# Patient Record
Sex: FEMALE | Race: OTHER | HISPANIC OR LATINO | URBAN - METROPOLITAN AREA
[De-identification: names, ages, dates, MRNs, and addresses within clinical notes are randomized per-mention and may not be internally consistent; named-entity substitution may affect disease eponyms.]

---

## 2018-04-19 ENCOUNTER — EMERGENCY (EMERGENCY)
Facility: HOSPITAL | Age: 33
LOS: 1 days | Discharge: HOME | End: 2018-04-19
Attending: EMERGENCY MEDICINE
Payer: MEDICAID

## 2018-04-19 VITALS — SYSTOLIC BLOOD PRESSURE: 125 MMHG | HEART RATE: 85 BPM | DIASTOLIC BLOOD PRESSURE: 79 MMHG

## 2018-04-19 VITALS
HEIGHT: 62 IN | DIASTOLIC BLOOD PRESSURE: 88 MMHG | OXYGEN SATURATION: 100 % | SYSTOLIC BLOOD PRESSURE: 128 MMHG | WEIGHT: 141.1 LBS | HEART RATE: 107 BPM | RESPIRATION RATE: 20 BRPM | TEMPERATURE: 99 F

## 2018-04-19 DIAGNOSIS — Z98.82 BREAST IMPLANT STATUS: Chronic | ICD-10-CM

## 2018-04-19 LAB
ALBUMIN SERPL ELPH-MCNC: 3.8 G/DL — SIGNIFICANT CHANGE UP (ref 3.5–5)
ALP SERPL-CCNC: 94 U/L — SIGNIFICANT CHANGE UP (ref 40–120)
ALT FLD-CCNC: 22 U/L DA — SIGNIFICANT CHANGE UP (ref 10–60)
ANION GAP SERPL CALC-SCNC: 8 MMOL/L — SIGNIFICANT CHANGE UP (ref 5–17)
APPEARANCE UR: CLEAR — SIGNIFICANT CHANGE UP
APTT BLD: 30.1 SEC — SIGNIFICANT CHANGE UP (ref 27.5–37.4)
AST SERPL-CCNC: 12 U/L — SIGNIFICANT CHANGE UP (ref 10–40)
BASOPHILS # BLD AUTO: 0 K/UL — SIGNIFICANT CHANGE UP (ref 0–0.2)
BASOPHILS NFR BLD AUTO: 0.5 % — SIGNIFICANT CHANGE UP (ref 0–2)
BILIRUB SERPL-MCNC: 0.4 MG/DL — SIGNIFICANT CHANGE UP (ref 0.2–1.2)
BILIRUB UR-MCNC: NEGATIVE — SIGNIFICANT CHANGE UP
BUN SERPL-MCNC: 5 MG/DL — LOW (ref 7–18)
CALCIUM SERPL-MCNC: 8.7 MG/DL — SIGNIFICANT CHANGE UP (ref 8.4–10.5)
CHLORIDE SERPL-SCNC: 106 MMOL/L — SIGNIFICANT CHANGE UP (ref 96–108)
CO2 SERPL-SCNC: 27 MMOL/L — SIGNIFICANT CHANGE UP (ref 22–31)
COLOR SPEC: YELLOW — SIGNIFICANT CHANGE UP
CREAT SERPL-MCNC: 0.62 MG/DL — SIGNIFICANT CHANGE UP (ref 0.5–1.3)
DIFF PNL FLD: ABNORMAL
EOSINOPHIL # BLD AUTO: 0.1 K/UL — SIGNIFICANT CHANGE UP (ref 0–0.5)
EOSINOPHIL NFR BLD AUTO: 1.6 % — SIGNIFICANT CHANGE UP (ref 0–6)
GLUCOSE SERPL-MCNC: 75 MG/DL — SIGNIFICANT CHANGE UP (ref 70–99)
GLUCOSE UR QL: NEGATIVE — SIGNIFICANT CHANGE UP
HCG SERPL-ACNC: <1 MIU/ML — SIGNIFICANT CHANGE UP
HCT VFR BLD CALC: 33.2 % — LOW (ref 34.5–45)
HGB BLD-MCNC: 11.2 G/DL — LOW (ref 11.5–15.5)
INR BLD: 1.13 RATIO — SIGNIFICANT CHANGE UP (ref 0.88–1.16)
KETONES UR-MCNC: NEGATIVE — SIGNIFICANT CHANGE UP
LEUKOCYTE ESTERASE UR-ACNC: ABNORMAL
LYMPHOCYTES # BLD AUTO: 1.3 K/UL — SIGNIFICANT CHANGE UP (ref 1–3.3)
LYMPHOCYTES # BLD AUTO: 24.7 % — SIGNIFICANT CHANGE UP (ref 13–44)
MCHC RBC-ENTMCNC: 27.4 PG — SIGNIFICANT CHANGE UP (ref 27–34)
MCHC RBC-ENTMCNC: 33.9 GM/DL — SIGNIFICANT CHANGE UP (ref 32–36)
MCV RBC AUTO: 80.9 FL — SIGNIFICANT CHANGE UP (ref 80–100)
MONOCYTES # BLD AUTO: 0.2 K/UL — SIGNIFICANT CHANGE UP (ref 0–0.9)
MONOCYTES NFR BLD AUTO: 4.5 % — SIGNIFICANT CHANGE UP (ref 2–14)
NEUTROPHILS # BLD AUTO: 3.7 K/UL — SIGNIFICANT CHANGE UP (ref 1.8–7.4)
NEUTROPHILS NFR BLD AUTO: 68.7 % — SIGNIFICANT CHANGE UP (ref 43–77)
NITRITE UR-MCNC: NEGATIVE — SIGNIFICANT CHANGE UP
PH UR: 6.5 — SIGNIFICANT CHANGE UP (ref 5–8)
PLATELET # BLD AUTO: 270 K/UL — SIGNIFICANT CHANGE UP (ref 150–400)
POTASSIUM SERPL-MCNC: 3.4 MMOL/L — LOW (ref 3.5–5.3)
POTASSIUM SERPL-SCNC: 3.4 MMOL/L — LOW (ref 3.5–5.3)
PROT SERPL-MCNC: 8.3 G/DL — SIGNIFICANT CHANGE UP (ref 6–8.3)
PROT UR-MCNC: NEGATIVE — SIGNIFICANT CHANGE UP
PROTHROM AB SERPL-ACNC: 12.3 SEC — SIGNIFICANT CHANGE UP (ref 9.8–12.7)
RBC # BLD: 4.1 M/UL — SIGNIFICANT CHANGE UP (ref 3.8–5.2)
RBC # FLD: 14.4 % — SIGNIFICANT CHANGE UP (ref 10.3–14.5)
SODIUM SERPL-SCNC: 141 MMOL/L — SIGNIFICANT CHANGE UP (ref 135–145)
SP GR SPEC: 1.01 — SIGNIFICANT CHANGE UP (ref 1.01–1.02)
UROBILINOGEN FLD QL: NEGATIVE — SIGNIFICANT CHANGE UP
WBC # BLD: 5.4 K/UL — SIGNIFICANT CHANGE UP (ref 3.8–10.5)
WBC # FLD AUTO: 5.4 K/UL — SIGNIFICANT CHANGE UP (ref 3.8–10.5)

## 2018-04-19 PROCEDURE — 99284 EMERGENCY DEPT VISIT MOD MDM: CPT | Mod: 25

## 2018-04-19 PROCEDURE — 96374 THER/PROPH/DIAG INJ IV PUSH: CPT

## 2018-04-19 PROCEDURE — 99285 EMERGENCY DEPT VISIT HI MDM: CPT

## 2018-04-19 PROCEDURE — 80053 COMPREHEN METABOLIC PANEL: CPT

## 2018-04-19 PROCEDURE — 84702 CHORIONIC GONADOTROPIN TEST: CPT

## 2018-04-19 PROCEDURE — 81001 URINALYSIS AUTO W/SCOPE: CPT

## 2018-04-19 PROCEDURE — 36415 COLL VENOUS BLD VENIPUNCTURE: CPT

## 2018-04-19 PROCEDURE — 87086 URINE CULTURE/COLONY COUNT: CPT

## 2018-04-19 PROCEDURE — 85730 THROMBOPLASTIN TIME PARTIAL: CPT

## 2018-04-19 PROCEDURE — 85027 COMPLETE CBC AUTOMATED: CPT

## 2018-04-19 PROCEDURE — 86901 BLOOD TYPING SEROLOGIC RH(D): CPT

## 2018-04-19 PROCEDURE — 86900 BLOOD TYPING SEROLOGIC ABO: CPT

## 2018-04-19 PROCEDURE — 86850 RBC ANTIBODY SCREEN: CPT

## 2018-04-19 PROCEDURE — 85610 PROTHROMBIN TIME: CPT

## 2018-04-19 PROCEDURE — 87186 SC STD MICRODIL/AGAR DIL: CPT

## 2018-04-19 PROCEDURE — 87040 BLOOD CULTURE FOR BACTERIA: CPT

## 2018-04-19 RX ORDER — PIPERACILLIN AND TAZOBACTAM 4; .5 G/20ML; G/20ML
3.38 INJECTION, POWDER, LYOPHILIZED, FOR SOLUTION INTRAVENOUS ONCE
Qty: 0 | Refills: 0 | Status: COMPLETED | OUTPATIENT
Start: 2018-04-19 | End: 2018-04-19

## 2018-04-19 RX ORDER — CEPHALEXIN 500 MG
1 CAPSULE ORAL
Qty: 40 | Refills: 0 | OUTPATIENT
Start: 2018-04-19 | End: 2018-04-28

## 2018-04-19 RX ORDER — SODIUM CHLORIDE 9 MG/ML
1000 INJECTION INTRAMUSCULAR; INTRAVENOUS; SUBCUTANEOUS ONCE
Qty: 0 | Refills: 0 | Status: DISCONTINUED | OUTPATIENT
Start: 2018-04-19 | End: 2018-04-19

## 2018-04-19 RX ADMIN — PIPERACILLIN AND TAZOBACTAM 200 GRAM(S): 4; .5 INJECTION, POWDER, LYOPHILIZED, FOR SOLUTION INTRAVENOUS at 10:54

## 2018-04-19 NOTE — ED PROVIDER NOTE - SKIN WOUND TYPE
left breast abscess, + fluctuance, no discharge, open wound 5x5 dehisence without bleeding./abscess(s)

## 2018-04-19 NOTE — ED ADULT NURSE NOTE - OBJECTIVE STATEMENT
pt came to ed stating she had breast augmentation in [place] a month ago. she states the left breast has had an abscess for the past week

## 2018-04-19 NOTE — ED PROVIDER NOTE - OBJECTIVE STATEMENT
33 yo F with no Past Medical History that presents with left breast wound s/p breast augmentation and mastopexy March 9, 2018 in [facility]. Was told by surgeon that everything was okay. no fevers, chills, N/V/D, chest pain, SOB, abd pain. No trauma. She noticed in last 2 wks that left breast under nipple that there was an open wound and minimal discharge and bleeding. Now worsening so came to ED for evaluation. No other complaints. eating and drinking well. no smoking, drinking, drugs.  LMP ended yday. No urinary symptoms.

## 2018-04-19 NOTE — ED PROVIDER NOTE - MEDICAL DECISION MAKING DETAILS
31 yo F with no Past Medical History that had breast augmentation 1 month ago in [facility] that presents with left breat pain and discharge with open wound found to have abscess on exam. No systemic symptoms. Plastic Dr Callaway consulted and will see pt in his office tomorrow. Phone and address for office provided to pt and one dose IVAbx given to pt in ED, and Rx for keflex sent to pharmacy. Labs unremarkable. No WBC elevation. no fevers per pt. Cultures sent. Will send home, f/u with Dr Callaway tomorrow. Appt made by me for pt for Dr Callaway tomorrow at 10 am. Return precautions explained to pt. She understands and has no further questions.

## 2018-04-24 LAB
CULTURE RESULTS: SIGNIFICANT CHANGE UP
CULTURE RESULTS: SIGNIFICANT CHANGE UP
SPECIMEN SOURCE: SIGNIFICANT CHANGE UP
SPECIMEN SOURCE: SIGNIFICANT CHANGE UP

## 2019-12-28 ENCOUNTER — EMERGENCY (EMERGENCY)
Facility: HOSPITAL | Age: 34
LOS: 1 days | Discharge: HOME | End: 2019-12-28
Attending: EMERGENCY MEDICINE
Payer: COMMERCIAL

## 2019-12-28 VITALS
SYSTOLIC BLOOD PRESSURE: 112 MMHG | HEIGHT: 63 IN | DIASTOLIC BLOOD PRESSURE: 67 MMHG | OXYGEN SATURATION: 99 % | WEIGHT: 147.05 LBS | RESPIRATION RATE: 18 BRPM | TEMPERATURE: 98 F | HEART RATE: 83 BPM

## 2019-12-28 DIAGNOSIS — Z98.82 BREAST IMPLANT STATUS: Chronic | ICD-10-CM

## 2019-12-28 PROCEDURE — 99282 EMERGENCY DEPT VISIT SF MDM: CPT

## 2019-12-28 NOTE — ED PROVIDER NOTE - PATIENT PORTAL LINK FT
You can access the FollowMyHealth Patient Portal offered by [facility] by registering at the following website: [email or web address]. By joining [employer]’s FollowMyHealth portal, you will also be able to view your health information using other applications (apps) compatible with our system.

## 2019-12-28 NOTE — ED PROVIDER NOTE - OBJECTIVE STATEMENT
33 y/o F with no significant PMHx/PSHx presents to ED c/o intermittent, lateral posterior thigh pain x 1 month. Pt reports spending a lot of time on her feet at work, she also reports a 2012 plastic surgery that involved lipo-polymer injections into her buttocks. Denies any complications after procedure. She is concerned if those injections might be the cause of her pain. Denies back pain. Denies BC use and or recent travel. NKDA.

## 2019-12-28 NOTE — ED ADULT TRIAGE NOTE - HEIGHT IN CM
Patient had cortisone injection Monday, having high blood sugars since in the 200s and pounding headache. Patient states took 5 Metformin yesterday to bring sugars down. Patient reports had blood sugar 146 this morning, currently 210, ate breakfast 0810am. Headache had resolved this morning. Patient asking if should take some extra Metformin for a few days based on Blood sugar readings?  Provider please review and advise. Thank you.     160.02

## 2020-08-30 ENCOUNTER — TRANSCRIPTION ENCOUNTER (OUTPATIENT)
Age: 35
End: 2020-08-30

## 2021-04-12 ENCOUNTER — TRANSCRIPTION ENCOUNTER (OUTPATIENT)
Age: 36
End: 2021-04-12

## 2021-05-26 ENCOUNTER — TRANSCRIPTION ENCOUNTER (OUTPATIENT)
Age: 36
End: 2021-05-26

## 2021-06-01 ENCOUNTER — TRANSCRIPTION ENCOUNTER (OUTPATIENT)
Age: 36
End: 2021-06-01

## 2021-07-07 NOTE — ED ADULT NURSE NOTE - CAS DISCH ACCOMP BY
Family Detail Level: Zone Render In Strict Bullet Format?: No Discontinue Regimen: Betamethasone 0.05% cream

## 2021-09-04 ENCOUNTER — TRANSCRIPTION ENCOUNTER (OUTPATIENT)
Age: 36
End: 2021-09-04

## 2021-09-23 ENCOUNTER — TRANSCRIPTION ENCOUNTER (OUTPATIENT)
Age: 36
End: 2021-09-23

## 2021-09-30 ENCOUNTER — TRANSCRIPTION ENCOUNTER (OUTPATIENT)
Age: 36
End: 2021-09-30

## 2021-10-07 ENCOUNTER — TRANSCRIPTION ENCOUNTER (OUTPATIENT)
Age: 36
End: 2021-10-07

## 2021-10-14 ENCOUNTER — TRANSCRIPTION ENCOUNTER (OUTPATIENT)
Age: 36
End: 2021-10-14

## 2021-10-21 ENCOUNTER — TRANSCRIPTION ENCOUNTER (OUTPATIENT)
Age: 36
End: 2021-10-21

## 2021-10-28 ENCOUNTER — TRANSCRIPTION ENCOUNTER (OUTPATIENT)
Age: 36
End: 2021-10-28

## 2021-11-03 ENCOUNTER — TRANSCRIPTION ENCOUNTER (OUTPATIENT)
Age: 36
End: 2021-11-03

## 2021-11-11 ENCOUNTER — TRANSCRIPTION ENCOUNTER (OUTPATIENT)
Age: 36
End: 2021-11-11

## 2021-11-18 ENCOUNTER — TRANSCRIPTION ENCOUNTER (OUTPATIENT)
Age: 36
End: 2021-11-18

## 2021-11-28 ENCOUNTER — TRANSCRIPTION ENCOUNTER (OUTPATIENT)
Age: 36
End: 2021-11-28

## 2021-12-10 ENCOUNTER — TRANSCRIPTION ENCOUNTER (OUTPATIENT)
Age: 36
End: 2021-12-10

## 2021-12-28 ENCOUNTER — TRANSCRIPTION ENCOUNTER (OUTPATIENT)
Age: 36
End: 2021-12-28

## 2022-01-12 ENCOUNTER — TRANSCRIPTION ENCOUNTER (OUTPATIENT)
Age: 37
End: 2022-01-12

## 2022-06-28 PROBLEM — Z00.00 ENCOUNTER FOR PREVENTIVE HEALTH EXAMINATION: Status: ACTIVE | Noted: 2022-06-28

## 2022-07-11 ENCOUNTER — APPOINTMENT (OUTPATIENT)
Dept: OBGYN | Facility: CLINIC | Age: 37
End: 2022-07-11

## 2022-07-11 ENCOUNTER — LABORATORY RESULT (OUTPATIENT)
Age: 37
End: 2022-07-11

## 2022-07-11 ENCOUNTER — TRANSCRIPTION ENCOUNTER (OUTPATIENT)
Age: 37
End: 2022-07-11

## 2022-07-11 VITALS — WEIGHT: 123 LBS | SYSTOLIC BLOOD PRESSURE: 123 MMHG | DIASTOLIC BLOOD PRESSURE: 79 MMHG

## 2022-07-11 DIAGNOSIS — N76.0 ACUTE VAGINITIS: ICD-10-CM

## 2022-07-11 DIAGNOSIS — Z01.419 ENCOUNTER FOR GYNECOLOGICAL EXAMINATION (GENERAL) (ROUTINE) W/OUT ABNORMAL FINDINGS: ICD-10-CM

## 2022-07-11 DIAGNOSIS — B96.89 ACUTE VAGINITIS: ICD-10-CM

## 2022-07-11 DIAGNOSIS — Z98.890 OTHER SPECIFIED POSTPROCEDURAL STATES: ICD-10-CM

## 2022-07-11 PROCEDURE — 99385 PREV VISIT NEW AGE 18-39: CPT

## 2022-07-11 NOTE — PHYSICAL EXAM
[Examination Of The Breasts] : a normal appearance [Breast Reconstruction Right] : breast reconstruction [Breast Reconstruction Left] : breast reconstruction [No Masses] : no breast masses were palpable [Labia Majora] : normal [Labia Minora] : normal [Discharge] : a  ~M vaginal discharge was present [Normal] : normal [Uterine Adnexae] : normal [FreeTextEntry4] : bv

## 2022-07-11 NOTE — HISTORY OF PRESENT ILLNESS
[FreeTextEntry1] : new patient \par here for annual\par last GYN visit 1 year ago \par has regular menses, heavy \par hx 2 Cd and BTL

## 2022-08-23 ENCOUNTER — APPOINTMENT (OUTPATIENT)
Dept: OBGYN | Facility: CLINIC | Age: 37
End: 2022-08-23

## 2022-08-23 VITALS
SYSTOLIC BLOOD PRESSURE: 134 MMHG | WEIGHT: 128 LBS | DIASTOLIC BLOOD PRESSURE: 87 MMHG | BODY MASS INDEX: 22.68 KG/M2 | HEIGHT: 63 IN

## 2022-09-16 ENCOUNTER — APPOINTMENT (OUTPATIENT)
Dept: OBGYN | Facility: CLINIC | Age: 37
End: 2022-09-16

## 2022-09-16 ENCOUNTER — LABORATORY RESULT (OUTPATIENT)
Age: 37
End: 2022-09-16

## 2022-09-16 VITALS — SYSTOLIC BLOOD PRESSURE: 124 MMHG | WEIGHT: 126 LBS | DIASTOLIC BLOOD PRESSURE: 83 MMHG | BODY MASS INDEX: 22.32 KG/M2

## 2022-09-16 DIAGNOSIS — R87.612 LOW GRADE SQUAMOUS INTRAEPITHELIAL LESION ON CYTOLOGIC SMEAR OF CERVIX (LGSIL): ICD-10-CM

## 2022-09-16 PROCEDURE — 57454 BX/CURETT OF CERVIX W/SCOPE: CPT

## 2022-09-17 PROBLEM — R87.612 LGSIL ON PAP SMEAR OF CERVIX: Status: ACTIVE | Noted: 2022-09-17

## 2022-09-17 NOTE — PROCEDURE
[Colposcopy] : Colposcopy  [Time out performed] : Pre-procedure time out performed.  Patient's name, date of birth and procedure confirmed. [Consent Obtained] : Consent obtained [Risks] : risks [Benefits] : benefits [Alternatives] : alternatives [Patient] : patient [Infection] : infection [Bleeding] : bleeding [Allergic Reaction] : allergic reaction [LGSIL] : LGSIL [HPV High Risk] : HPV high risk [No Premedication] : no premedication [Colposcopy Adequate] : colposcopy adequate [No Abnormalities] : no abnormalities [Biopsy] : biopsy taken [Hemostasis Obtained] : Hemostasis obtained [Tolerated Well] : the patient tolerated the procedure well [de-identified] : 1 [de-identified] : 7 o'clock

## 2023-06-14 ENCOUNTER — EMERGENCY (EMERGENCY)
Facility: HOSPITAL | Age: 38
LOS: 1 days | Discharge: HOME | End: 2023-06-14
Attending: EMERGENCY MEDICINE
Payer: MEDICAID

## 2023-06-14 VITALS
SYSTOLIC BLOOD PRESSURE: 118 MMHG | TEMPERATURE: 99 F | OXYGEN SATURATION: 98 % | HEIGHT: 62 IN | RESPIRATION RATE: 17 BRPM | HEART RATE: 88 BPM | WEIGHT: 121.92 LBS | DIASTOLIC BLOOD PRESSURE: 77 MMHG

## 2023-06-14 DIAGNOSIS — Z98.82 BREAST IMPLANT STATUS: Chronic | ICD-10-CM

## 2023-06-14 LAB
ALBUMIN SERPL ELPH-MCNC: 3.4 G/DL — LOW (ref 3.5–5)
ALP SERPL-CCNC: 73 U/L — SIGNIFICANT CHANGE UP (ref 40–120)
ALT FLD-CCNC: 16 U/L DA — SIGNIFICANT CHANGE UP (ref 10–60)
ANION GAP SERPL CALC-SCNC: 3 MMOL/L — LOW (ref 5–17)
ANISOCYTOSIS BLD QL: SLIGHT — SIGNIFICANT CHANGE UP
APPEARANCE UR: CLEAR — SIGNIFICANT CHANGE UP
AST SERPL-CCNC: 15 U/L — SIGNIFICANT CHANGE UP (ref 10–40)
BACTERIA # UR AUTO: ABNORMAL /HPF
BASOPHILS # BLD AUTO: 0.02 K/UL — SIGNIFICANT CHANGE UP (ref 0–0.2)
BASOPHILS NFR BLD AUTO: 0.1 % — SIGNIFICANT CHANGE UP (ref 0–2)
BILIRUB SERPL-MCNC: 0.2 MG/DL — SIGNIFICANT CHANGE UP (ref 0.2–1.2)
BILIRUB UR-MCNC: NEGATIVE — SIGNIFICANT CHANGE UP
BUN SERPL-MCNC: 8 MG/DL — SIGNIFICANT CHANGE UP (ref 7–18)
CALCIUM SERPL-MCNC: 9.2 MG/DL — SIGNIFICANT CHANGE UP (ref 8.4–10.5)
CHLORIDE SERPL-SCNC: 108 MMOL/L — SIGNIFICANT CHANGE UP (ref 96–108)
CO2 SERPL-SCNC: 25 MMOL/L — SIGNIFICANT CHANGE UP (ref 22–31)
COLOR SPEC: YELLOW — SIGNIFICANT CHANGE UP
CREAT SERPL-MCNC: 0.52 MG/DL — SIGNIFICANT CHANGE UP (ref 0.5–1.3)
DACRYOCYTES BLD QL SMEAR: SLIGHT — SIGNIFICANT CHANGE UP
DIFF PNL FLD: ABNORMAL
EGFR: 123 ML/MIN/1.73M2 — SIGNIFICANT CHANGE UP
ELLIPTOCYTES BLD QL SMEAR: SLIGHT — SIGNIFICANT CHANGE UP
EOSINOPHIL # BLD AUTO: 0.02 K/UL — SIGNIFICANT CHANGE UP (ref 0–0.5)
EOSINOPHIL NFR BLD AUTO: 0.1 % — SIGNIFICANT CHANGE UP (ref 0–6)
EPI CELLS # UR: ABNORMAL /HPF
GLUCOSE SERPL-MCNC: 98 MG/DL — SIGNIFICANT CHANGE UP (ref 70–99)
GLUCOSE UR QL: NEGATIVE — SIGNIFICANT CHANGE UP
HCG SERPL-ACNC: <1 MIU/ML — SIGNIFICANT CHANGE UP
HCT VFR BLD CALC: 29.3 % — LOW (ref 34.5–45)
HGB BLD-MCNC: 8.6 G/DL — LOW (ref 11.5–15.5)
HYPOCHROMIA BLD QL: SIGNIFICANT CHANGE UP
IMM GRANULOCYTES NFR BLD AUTO: 0.4 % — SIGNIFICANT CHANGE UP (ref 0–0.9)
KETONES UR-MCNC: NEGATIVE — SIGNIFICANT CHANGE UP
LACTATE SERPL-SCNC: 1.1 MMOL/L — SIGNIFICANT CHANGE UP (ref 0.7–2)
LEUKOCYTE ESTERASE UR-ACNC: ABNORMAL
LIDOCAIN IGE QN: 135 U/L — SIGNIFICANT CHANGE UP (ref 73–393)
LYMPHOCYTES # BLD AUTO: 1.8 K/UL — SIGNIFICANT CHANGE UP (ref 1–3.3)
LYMPHOCYTES # BLD AUTO: 11.3 % — LOW (ref 13–44)
MANUAL SMEAR VERIFICATION: SIGNIFICANT CHANGE UP
MCHC RBC-ENTMCNC: 19.8 PG — LOW (ref 27–34)
MCHC RBC-ENTMCNC: 29.4 GM/DL — LOW (ref 32–36)
MCV RBC AUTO: 67.5 FL — LOW (ref 80–100)
MICROCYTES BLD QL: SIGNIFICANT CHANGE UP
MONOCYTES # BLD AUTO: 0.74 K/UL — SIGNIFICANT CHANGE UP (ref 0–0.9)
MONOCYTES NFR BLD AUTO: 4.6 % — SIGNIFICANT CHANGE UP (ref 2–14)
NEUTROPHILS # BLD AUTO: 13.28 K/UL — HIGH (ref 1.8–7.4)
NEUTROPHILS NFR BLD AUTO: 83.5 % — HIGH (ref 43–77)
NITRITE UR-MCNC: NEGATIVE — SIGNIFICANT CHANGE UP
NRBC # BLD: 0 /100 WBCS — SIGNIFICANT CHANGE UP (ref 0–0)
PH UR: 6.5 — SIGNIFICANT CHANGE UP (ref 5–8)
PLAT MORPH BLD: NORMAL — SIGNIFICANT CHANGE UP
PLATELET # BLD AUTO: 408 K/UL — HIGH (ref 150–400)
POIKILOCYTOSIS BLD QL AUTO: SLIGHT — SIGNIFICANT CHANGE UP
POTASSIUM SERPL-MCNC: 4.3 MMOL/L — SIGNIFICANT CHANGE UP (ref 3.5–5.3)
POTASSIUM SERPL-SCNC: 4.3 MMOL/L — SIGNIFICANT CHANGE UP (ref 3.5–5.3)
PROT SERPL-MCNC: 8.4 G/DL — HIGH (ref 6–8.3)
PROT UR-MCNC: 30 MG/DL
RBC # BLD: 4.34 M/UL — SIGNIFICANT CHANGE UP (ref 3.8–5.2)
RBC # FLD: 17.8 % — HIGH (ref 10.3–14.5)
RBC BLD AUTO: ABNORMAL
RBC CASTS # UR COMP ASSIST: ABNORMAL /HPF (ref 0–2)
SODIUM SERPL-SCNC: 136 MMOL/L — SIGNIFICANT CHANGE UP (ref 135–145)
SP GR SPEC: 1.01 — SIGNIFICANT CHANGE UP (ref 1.01–1.02)
UROBILINOGEN FLD QL: 4 MG/DL
WBC # BLD: 15.93 K/UL — HIGH (ref 3.8–10.5)
WBC # FLD AUTO: 15.93 K/UL — HIGH (ref 3.8–10.5)
WBC UR QL: ABNORMAL /HPF (ref 0–5)

## 2023-06-14 PROCEDURE — 76830 TRANSVAGINAL US NON-OB: CPT | Mod: 26

## 2023-06-14 PROCEDURE — 74177 CT ABD & PELVIS W/CONTRAST: CPT | Mod: 26,MG

## 2023-06-14 PROCEDURE — 76856 US EXAM PELVIC COMPLETE: CPT | Mod: 26

## 2023-06-14 PROCEDURE — 99285 EMERGENCY DEPT VISIT HI MDM: CPT

## 2023-06-14 PROCEDURE — G1004: CPT

## 2023-06-14 RX ORDER — CEFOTETAN DISODIUM 1 G
2 VIAL (EA) INJECTION ONCE
Refills: 0 | Status: COMPLETED | OUTPATIENT
Start: 2023-06-14 | End: 2023-06-14

## 2023-06-14 RX ORDER — CEFOTETAN DISODIUM 1 G
2000 VIAL (EA) INJECTION ONCE
Refills: 0 | Status: DISCONTINUED | OUTPATIENT
Start: 2023-06-14 | End: 2023-06-14

## 2023-06-14 RX ORDER — ACETAMINOPHEN 500 MG
1000 TABLET ORAL ONCE
Refills: 0 | Status: COMPLETED | OUTPATIENT
Start: 2023-06-14 | End: 2023-06-14

## 2023-06-14 RX ORDER — SODIUM CHLORIDE 9 MG/ML
1000 INJECTION INTRAMUSCULAR; INTRAVENOUS; SUBCUTANEOUS ONCE
Refills: 0 | Status: COMPLETED | OUTPATIENT
Start: 2023-06-14 | End: 2023-06-14

## 2023-06-14 RX ADMIN — Medication 400 MILLIGRAM(S): at 18:36

## 2023-06-14 RX ADMIN — Medication 100 GRAM(S): at 23:55

## 2023-06-14 RX ADMIN — Medication 1000 MILLIGRAM(S): at 19:06

## 2023-06-14 RX ADMIN — SODIUM CHLORIDE 1000 MILLILITER(S): 9 INJECTION INTRAMUSCULAR; INTRAVENOUS; SUBCUTANEOUS at 18:31

## 2023-06-14 RX ADMIN — SODIUM CHLORIDE 1000 MILLILITER(S): 9 INJECTION INTRAMUSCULAR; INTRAVENOUS; SUBCUTANEOUS at 19:31

## 2023-06-14 NOTE — ED PROVIDER NOTE - OBJECTIVE STATEMENT
39 y/o f with pmhx denies presents for abd pain on right side that began on Sunday. Pain was mild at first but worsening over the last few days. no fever no chills. no vomiting. no diarrhea. no back pain. no vaginal bleeding or discharge. no urinary symptoms. no hematuria. no medications taken. Pain worse today. LMP 5/28/23

## 2023-06-14 NOTE — ED PROVIDER NOTE - PHYSICAL EXAMINATION
Gen.  no acute resp distress  HEENT:  perrl eomi pharynx fabio  Lungs:  b/l bs  CVS: S1S2   Abd;  + right side abd tend to palp. no guarding no distention. no llq tend.   Ext: no edema no erythema  Neuro: aaox3   MSK: strength 5/5 b/l upper and lower ext.

## 2023-06-14 NOTE — ED PROVIDER NOTE - PROGRESS NOTE DETAILS
Karyn: as per obgyn pt can be dc home. rx for doxy for 10 days. f/u with OBGYN. return precautions discussed.

## 2023-06-14 NOTE — ED PROVIDER NOTE - NSFOLLOWUPINSTRUCTIONS_ED_ALL_ED_FT
Your diagnosis this visit was: Tubal ovarian abscess     From this ED visit you were prescribed: Doxycycline as prescribed     You may be contacted by our Emergency Department Referrals Coordinator to set up your follow-up appointment within 24-48 hours of your discharge, Monday through Friday.   We recommend you follow up with: Your obstetrician    alternatively our Gyn clinic at:  OBGYN at [facility]  [address] [facility] [address] · [phone]     Please return to the Emergency Department if you experience any of the following symptoms:  - Shortness of breath or trouble breathing  - Pressure, pain, or tightness in the chest  - Face drooping, arm weakness or speech difficulty,  - Persistent or severe vomiting  - Head injury or loss of consciousness  - Nonstop bleeding or an open wound    Ovarian Abscess    WHAT YOU NEED TO KNOW:    What is an ovarian abscess? An ovarian abscess is a pus-filled pocket in an ovary. An ovarian abscess is usually caused by bacteria that travel from another part of your body. The bacteria can also travel up your vagina and move into your uterus through your cervix. Bacteria infect the ovary or part of the fallopian tube next to the ovary. An abscess that starts in a fallopian tube and spreads to the ovary is called a tuboovarian abscess (TOA). Less commonly, the abscess can start in the ovary and not involve the fallopian tube.  Female Reproductive System    What increases my risk for an ovarian abscess?    Pelvic inflammatory disease (PID)    Unprotected sex, sex with more than one partner, or sex during adolescence    Anything that weakens your immune system, such as diabetes, HIV/AIDS, or chemotherapy    Infertility treatment that involved stimulating your ovaries    Diverticulitis, appendicitis, or inflammatory bowel disease    An infection in your pelvis after surgery, or an infection that travels through your bloodstream    Use of an intrauterine device (IUD) to prevent pregnancy  What are the signs and symptoms of an ovarian abscess?    Pain or ache in your abdomen or pelvis, or pain that worsens with activity or during sex    Tender area in your lower abdomen    Heavy monthly periods, spotting, or vaginal bleeding between periods    Vaginal discharge    Nausea or vomiting    Lower back pain    Fatigue or fever  How is an ovarian abscess diagnosed? Your healthcare provider will ask about your symptoms and when they started. Tell your healthcare provider about any medical conditions (such as PID) or surgeries you have had. Tell your healthcare provider about any sexually transmitted infections (STIs) that you or your partner may have. You may be given a pregnancy test as well as any of the following:    A bimanual vaginal exam is used to examine your cervix and ovaries. Your healthcare provider will insert 2 gloved fingers into your vagina and place the other hand on your abdomen. Your healthcare provider will feel for your cervix and ovaries while pressing down lightly on your abdomen. If you feel pain, you may have PID.    Blood and urine tests may be used to check for infection. The tests may also show if another condition is causing your symptoms.    A culture or smear test is used to take a sample of discharge from your vagina or cervix to be tested.    An ultrasound or CT may be used to show pictures of your ovary and abscess. The pictures may help confirm that you have an abscess and how large it is. You may be given contrast liquid before a CT scan to help the abscess show up better. Tell the healthcare provider if you have ever had an allergic reaction to contrast liquid.    Laparoscopy is surgery to look directly at your uterus, ovaries, and fallopian tubes. A scope is put into your abdomen through a small incision. You may have one or more incisions in or below your bellybutton. Ask your healthcare provider for more information about this surgery.  How is an ovarian abscess treated? An ovarian abscess may need to be treated in the hospital. You may need any of the following:    Antibiotics are given to fight a bacterial infection. You may get antibiotics through an IV for several days.    Drainage is a procedure used to drain the bacteria from your ovary. Drainage may be done through a needle or during surgery. The area that had the abscess will then be cleaned out.    A hysterectomy may be needed if the infection spreads from the ovary. You may need to have one or both ovaries removed. Your fallopian tubes and uterus may also need to be removed. A hysterectomy will prevent you from being able to become pregnant. Talk to your healthcare provider about other treatment options if you want to have children.    Surgery may be used to remove the abscess. Surgery will be necessary if the abscess ruptures. A ruptured ovarian abscess is a life-threatening emergency that needs immediate treatment.  What can I do to manage an ovarian abscess?    Do not have sex until your healthcare provider says it is okay. You will need to finish treatment before it is safe to have sex.    Do not have unprotected sex. Always use a latex condom. Do not have sex while you or your partners are being treated for an STI.    Talk to your sex partners. If you have an STI, tell your recent partners. Tell them to see a healthcare provider for testing and treatment. This will help stop the spread of infection to others or back to you.  When should I seek immediate care?    You have sudden, severe abdominal or pelvic pain.    Your heartbeat or breathing is faster than normal for you.    You have heavy vaginal bleeding and feel lightheaded.    You have new or worsening pain.  When should I contact my healthcare provider?    You have nausea or are vomiting.    Your pain does not get better even after you take pain medicine.    You have questions or concerns about your condition or care.  CARE AGREEMENT:    You have the right to help plan your care. Learn about your health condition and how it may be treated. Discuss treatment options with your healthcare providers to decide what care you want to receive. You always have the right to refuse treatment.

## 2023-06-14 NOTE — ED PROVIDER NOTE - PATIENT PORTAL LINK FT
You can access the FollowMyHealth Patient Portal offered by [facility] by registering at the following website: [email or web address]. By joining [employer]’s FollowMyHealth portal, you will also be able to view your health information using other applications (apps) compatible with our system.

## 2023-06-14 NOTE — ED ADULT NURSE NOTE - OBJECTIVE STATEMENT
39 yo female sitting on a chair c/o right-sided abdominal pain that started 3 days ago. Patient denies nausea, vomiting, or diarrhea at this time.

## 2023-06-14 NOTE — ED PROVIDER NOTE - CLINICAL SUMMARY MEDICAL DECISION MAKING FREE TEXT BOX
ATTG: : abd pain on right lower quad pain, concerns include but not limited to appy / uti / gyn causeas (ectopic preg) will check hcg, check labs, check ct a/p (Pending hcg), pain medication and re eval for dispo.

## 2023-06-14 NOTE — CONSULT NOTE ADULT - PROBLEM SELECTOR RECOMMENDATION 9
A/p: 37 y/o female with hydrosalpinx, likely 2/2 tubo-ovarian abscess. pt stable  - pt stable for discharge  - s/p cefotetan in ED  - patient to be discharged with doxycycline 100 BID x10 days  - strict follow up instructions with patient's outpatient PCP who provides Gyn care, but [facility] clinic information given as well for patient to establish ob/gyn care  - return precautions discussed A/p: 37 y/o female with hydrosalpinx, likely 2/2 tubo-ovarian abscess. pt stable  - pt stable for discharge  - cervical culture taken  - s/p cefotetan in ED  - patient to be discharged with doxycycline 100 BID x10 days  - strict follow up instructions with patient's outpatient PCP who provides Gyn care, but [facility] clinic information given as well for patient to establish ob/gyn care  - return precautions discussed

## 2023-06-14 NOTE — ED PROVIDER NOTE - NSFOLLOWUPCLINICS_GEN_ALL_ED_FT
Michael BROWN  [facility]  [address] [place] [address]  Phone: [phone]  Fax: [phone]

## 2023-06-14 NOTE — CONSULT NOTE ADULT - SUBJECTIVE AND OBJECTIVE BOX
39 y/o female presents to the ED with progressively worsening lower abdominal pain x 4 days.   Denies vaginal bleeding, vaginal discharge, chest pain, shortness of breath, dizziness, palpitations, n/v/d/c, fever, chills or any other complaints     LMP 2023, pt last sexually active approx 1 month ago  Ob:   1)  prim c/s  2)  rpt c/s  Gyn: pt admits to menstrual periods q28 days with bleeding for 5-6 days. Patient's PCP provides her primary Ob/Gyn care, but she does not specifically see an Ob/Gyn  Pt denies std's, abnormal pap smear, fibroids or ovarian cysts  pmhx: denies  pshx: c/s x2, approx  abdominoplasty  allergies: nka  meds: nka  sochx: Pt denies etoh/drug/tobacco use  Psych denies h/o anxiety or depression    REVIEW OF SYSTEMS: see HPI	    PE:  Vital Signs Last 24 Hrs  T(C): 36.7 (15 Glenn 2023 00:30), Max: 37.1 (2023 16:08)  T(F): 98.1 (15 Glenn 2023 00:30), Max: 98.7 (2023 16:08)  HR: 81 (15 Glenn 2023 00:30) (81 - 88)  BP: 106/71 (15 Glenn 2023 00:30) (106/71 - 118/77)  BP(mean): --  RR: 16 (15 Glenn 2023 00:30) (16 - 17)  SpO2: 98% (15 Glenn 2023 00:30) (98% - 98%)    Parameters below as of 15 Glenn 2023 00:30  Patient On (Oxygen Delivery Method): room air      Gen: A&Ox3, NAD, patient sitting patiently and preoccupied with phone and other belongings, appears comfortable, in no acute distress  abd: soft, nt, nd, no rebound or guarding; no cvat b/l  pelvis: no cervical motion tenderness; moderately thick white/beige non-odorous discharge observed in the cervical os, no vaginal bleeding, cervix closed/long, no uterine tenderness; uterus approx 8wks size regular in contour, mobile. mild right adnexal tenderness with deep palpation, no left sided adnexal tenderness, no masses appreciated b/l     LABS:                        8.6    15.93 )-----------( 408      ( 2023 18:30 )             29.3     06-14    136  |  108  |  8   ----------------------------<  98  4.3   |  25  |  0.52    Ca    9.2      2023 18:30    TPro  8.4<H>  /  Alb  3.4<L>  /  TBili  0.2  /  DBili  x   /  AST  15  /  ALT  16  /  AlkPhos  73        Urinalysis Basic - ( 2023 18:30 )    Color: Yellow / Appearance: Clear / S.010 / pH: x  Gluc: x / Ketone: Negative  / Bili: Negative / Urobili: 4 mg/dL   Blood: x / Protein: 30 mg/dL / Nitrite: Negative   Leuk Esterase: Moderate / RBC: 2-5 /HPF / WBC 11-25 /HPF   Sq Epi: x / Non Sq Epi: x / Bacteria: Moderate /HPF    HCG Quantitative, Serum: <1      RADIOLOGY & ADDITIONAL STUDIES:  < from: US Pelvis Complete (US Pelvis Complete .) (23 @ 22:34) >  ACC: [id number] EXAM:  US TRANSVAGINAL   ORDERED BY: SAMARA VANN     ACC: [id number] EXAM:  US PELVIC COMPLETE   ORDERED BY: SAMARA VANN     PROCEDURE DATE:  2023          INTERPRETATION:  CLINICAL INFORMATION: Pelvic pain with right adnexal   collection on CT    LMP: 2023    COMPARISON: Same day CT    TECHNIQUE:  Endovaginal and transabdominal pelvic sonogram. Color and Spectral   Doppler was performed.    FINDINGS:  Uterus: Normal size. Within normal limits.  Endometrium: . Within normal limits.    Right ovary: Normal size. Within normal limits. Flow is documented.  Left ovary: Normal size. Within normal limits. Flow is documented.    5.3 cm right adnexal tubular structure.    Fluid: None.    IMPRESSION:  5.3 cm right adnexal dilated tubular structure corresponding to   abnormality on CT. In conjunction with findings of CT, this likely   represents hydrosalpinx. Correlate for pyosalpinx/salpingitis.      --- End of Report ---            RENEE CAMEJO MD; Attending Radiologist  This document has been electronically signed. 2023 10:38PM    < end of copied text >  < from: US Transvaginal (23 @ 22:33) >  ACC: [id number] EXAM:  US TRANSVAGINAL   ORDERED BY: SAMARA VANN     ACC: [id number] EXAM:  US PELVIC COMPLETE   ORDERED BY: SAMARA VANN     PROCEDURE DATE:  2023          INTERPRETATION:  CLINICAL INFORMATION: Pelvic pain with right adnexal   collection on CT    LMP: 2023    COMPARISON: Same day CT    TECHNIQUE:  Endovaginal and transabdominal pelvic sonogram. Color and Spectral   Doppler was performed.    FINDINGS:  Uterus: Normal size. Within normal limits.  Endometrium: . Within normal limits.    Right ovary: Normal size. Within normal limits. Flow is documented.  Left ovary: Normal size. Within normal limits. Flow is documented.    5.3 cm right adnexal tubular structure.    Fluid: None.    IMPRESSION:  5.3 cm right adnexal dilated tubular structure corresponding to   abnormality on CT. In conjunction with findings of CT, this likely   represents hydrosalpinx. Correlate for pyosalpinx/salpingitis.      --- End of Report ---            RENEE CAMEJO MD; Attending Radiologist  This document has been electronically signed. 2023 10:38PM    < end of copied text >      A/p: 39 y/o female with hydrosalpinx, likely 2/2 tubo-ovarian abscess. pt stable  - pt stable for discharge  - s/p cefotetan in ED  - patient to be discharged with doxycycline 100 BID x10 days  - strict follow up instructions with patient's outpatient PCP who provides Gyn care, but [facility] clinic information given as well for patient to establish ob/gyn care  - return precautions discussed    -d/w Dr. Kalpesh gzt attending 39 y/o female presents to the ED with progressively worsening lower abdominal pain x 4 days.   Denies vaginal bleeding, vaginal discharge, chest pain, shortness of breath, dizziness, palpitations, n/v/d/c, fever, chills or any other complaints     LMP 2023, pt last sexually active approx 1 month ago  Ob:   1)  prim c/s  2)  rpt c/s  Gyn: pt admits to menstrual periods q28 days with bleeding for 5-6 days. Patient's PCP provides her primary Ob/Gyn care, but she does not specifically see an Ob/Gyn  Pt denies std's, abnormal pap smear, fibroids or ovarian cysts  pmhx: denies  pshx: c/s x2, approx  abdominoplasty  allergies: nka  meds: nka  sochx: Pt denies etoh/drug/tobacco use  Psych denies h/o anxiety or depression    REVIEW OF SYSTEMS: see HPI	    PE:  Vital Signs Last 24 Hrs  T(C): 36.7 (15 Glenn 2023 00:30), Max: 37.1 (2023 16:08)  T(F): 98.1 (15 Glenn 2023 00:30), Max: 98.7 (2023 16:08)  HR: 81 (15 Glenn 2023 00:30) (81 - 88)  BP: 106/71 (15 Glenn 2023 00:30) (106/71 - 118/77)  BP(mean): --  RR: 16 (15 Glenn 2023 00:30) (16 - 17)  SpO2: 98% (15 Glenn 2023 00:30) (98% - 98%)    Parameters below as of 15 Glenn 2023 00:30  Patient On (Oxygen Delivery Method): room air      Gen: A&Ox3, NAD, patient sitting patiently and preoccupied with phone and other belongings, appears comfortable, in no acute distress  abd: soft, nt, nd, no rebound or guarding; no cvat b/l  pelvis: no cervical motion tenderness; moderately thick white/beige non-odorous discharge observed in the cervical os, no vaginal bleeding, cervix closed/long, no uterine tenderness; uterus approx 8wks size regular in contour, mobile. mild right adnexal tenderness with deep palpation, no left sided adnexal tenderness, no masses appreciated b/l     LABS:                        8.6    15.93 )-----------( 408      ( 2023 18:30 )             29.3     06-14    136  |  108  |  8   ----------------------------<  98  4.3   |  25  |  0.52    Ca    9.2      2023 18:30    TPro  8.4<H>  /  Alb  3.4<L>  /  TBili  0.2  /  DBili  x   /  AST  15  /  ALT  16  /  AlkPhos  73        Urinalysis Basic - ( 2023 18:30 )    Color: Yellow / Appearance: Clear / S.010 / pH: x  Gluc: x / Ketone: Negative  / Bili: Negative / Urobili: 4 mg/dL   Blood: x / Protein: 30 mg/dL / Nitrite: Negative   Leuk Esterase: Moderate / RBC: 2-5 /HPF / WBC 11-25 /HPF   Sq Epi: x / Non Sq Epi: x / Bacteria: Moderate /HPF    HCG Quantitative, Serum: <1      RADIOLOGY & ADDITIONAL STUDIES:  < from: US Pelvis Complete (US Pelvis Complete .) (23 @ 22:34) >  ACC: [id number] EXAM:  US TRANSVAGINAL   ORDERED BY: SAMARA VANN     ACC: [id number] EXAM:  US PELVIC COMPLETE   ORDERED BY: SAMARA VANN     PROCEDURE DATE:  2023          INTERPRETATION:  CLINICAL INFORMATION: Pelvic pain with right adnexal   collection on CT    LMP: 2023    COMPARISON: Same day CT    TECHNIQUE:  Endovaginal and transabdominal pelvic sonogram. Color and Spectral   Doppler was performed.    FINDINGS:  Uterus: Normal size. Within normal limits.  Endometrium: . Within normal limits.    Right ovary: Normal size. Within normal limits. Flow is documented.  Left ovary: Normal size. Within normal limits. Flow is documented.    5.3 cm right adnexal tubular structure.    Fluid: None.    IMPRESSION:  5.3 cm right adnexal dilated tubular structure corresponding to   abnormality on CT. In conjunction with findings of CT, this likely   represents hydrosalpinx. Correlate for pyosalpinx/salpingitis.      --- End of Report ---            RENEE CAMEJO MD; Attending Radiologist  This document has been electronically signed. 2023 10:38PM    < end of copied text >  < from: US Transvaginal (23 @ 22:33) >  ACC: [id number] EXAM:  US TRANSVAGINAL   ORDERED BY: SAMARA VANN     ACC: [id number] EXAM:  US PELVIC COMPLETE   ORDERED BY: SAMARA VANN     PROCEDURE DATE:  2023          INTERPRETATION:  CLINICAL INFORMATION: Pelvic pain with right adnexal   collection on CT    LMP: 2023    COMPARISON: Same day CT    TECHNIQUE:  Endovaginal and transabdominal pelvic sonogram. Color and Spectral   Doppler was performed.    FINDINGS:  Uterus: Normal size. Within normal limits.  Endometrium: . Within normal limits.    Right ovary: Normal size. Within normal limits. Flow is documented.  Left ovary: Normal size. Within normal limits. Flow is documented.    5.3 cm right adnexal tubular structure.    Fluid: None.    IMPRESSION:  5.3 cm right adnexal dilated tubular structure corresponding to   abnormality on CT. In conjunction with findings of CT, this likely   represents hydrosalpinx. Correlate for pyosalpinx/salpingitis.      --- End of Report ---            RENEE CAMEJO MD; Attending Radiologist  This document has been electronically signed. 2023 10:38PM    < end of copied text >      A/p: 39 y/o female with hydrosalpinx, likely 2/2 tubo-ovarian abscess. pt stable  - pt stable for discharge  - cervical culture taken  - s/p cefotetan in ED  - patient to be discharged with doxycycline 100 BID x10 days  - strict follow up instructions with patient's outpatient PCP who provides Gyn care, but [facility] clinic information given as well for patient to establish ob/gyn care  - return precautions discussed    -d/w Dr. Kalpesh gtz attending

## 2023-06-15 VITALS
SYSTOLIC BLOOD PRESSURE: 106 MMHG | TEMPERATURE: 98 F | RESPIRATION RATE: 16 BRPM | DIASTOLIC BLOOD PRESSURE: 71 MMHG | OXYGEN SATURATION: 98 % | HEART RATE: 81 BPM

## 2023-06-15 DIAGNOSIS — N70.93 SALPINGITIS AND OOPHORITIS, UNSPECIFIED: ICD-10-CM

## 2023-06-15 PROCEDURE — 96365 THER/PROPH/DIAG IV INF INIT: CPT | Mod: XU

## 2023-06-15 PROCEDURE — 83690 ASSAY OF LIPASE: CPT

## 2023-06-15 PROCEDURE — 74177 CT ABD & PELVIS W/CONTRAST: CPT | Mod: MG

## 2023-06-15 PROCEDURE — 87077 CULTURE AEROBIC IDENTIFY: CPT

## 2023-06-15 PROCEDURE — 36415 COLL VENOUS BLD VENIPUNCTURE: CPT

## 2023-06-15 PROCEDURE — 76830 TRANSVAGINAL US NON-OB: CPT

## 2023-06-15 PROCEDURE — G1004: CPT

## 2023-06-15 PROCEDURE — 83605 ASSAY OF LACTIC ACID: CPT

## 2023-06-15 PROCEDURE — 80053 COMPREHEN METABOLIC PANEL: CPT

## 2023-06-15 PROCEDURE — 99284 EMERGENCY DEPT VISIT MOD MDM: CPT | Mod: 25

## 2023-06-15 PROCEDURE — 84702 CHORIONIC GONADOTROPIN TEST: CPT

## 2023-06-15 PROCEDURE — 85025 COMPLETE CBC W/AUTO DIFF WBC: CPT

## 2023-06-15 PROCEDURE — 96361 HYDRATE IV INFUSION ADD-ON: CPT

## 2023-06-15 PROCEDURE — 96375 TX/PRO/DX INJ NEW DRUG ADDON: CPT

## 2023-06-15 PROCEDURE — 87070 CULTURE OTHR SPECIMN AEROBIC: CPT

## 2023-06-15 PROCEDURE — 76856 US EXAM PELVIC COMPLETE: CPT

## 2023-06-15 PROCEDURE — 81001 URINALYSIS AUTO W/SCOPE: CPT

## 2023-06-15 RX ADMIN — Medication 2 GRAM(S): at 00:25

## 2023-06-15 RX ADMIN — Medication 110 MILLIGRAM(S): at 00:29

## 2023-06-18 LAB
CULTURE RESULTS: SIGNIFICANT CHANGE UP
SPECIMEN SOURCE: SIGNIFICANT CHANGE UP

## 2023-07-12 ENCOUNTER — APPOINTMENT (OUTPATIENT)
Dept: OBGYN | Facility: CLINIC | Age: 38
End: 2023-07-12
Payer: COMMERCIAL

## 2023-07-12 VITALS
WEIGHT: 118 LBS | HEART RATE: 106 BPM | DIASTOLIC BLOOD PRESSURE: 65 MMHG | OXYGEN SATURATION: 99 % | SYSTOLIC BLOOD PRESSURE: 120 MMHG | BODY MASS INDEX: 20.9 KG/M2

## 2023-07-12 PROCEDURE — 99395 PREV VISIT EST AGE 18-39: CPT

## 2023-07-13 LAB
C TRACH RRNA SPEC QL NAA+PROBE: NOT DETECTED
N GONORRHOEA RRNA SPEC QL NAA+PROBE: NOT DETECTED
SOURCE TP AMPLIFICATION: NORMAL

## 2023-07-19 LAB — CYTOLOGY CVX/VAG DOC THIN PREP: ABNORMAL

## 2023-11-22 ENCOUNTER — EMERGENCY (EMERGENCY)
Facility: HOSPITAL | Age: 38
LOS: 1 days | Discharge: HOME | End: 2023-11-22
Attending: STUDENT IN AN ORGANIZED HEALTH CARE EDUCATION/TRAINING PROGRAM
Payer: COMMERCIAL

## 2023-11-22 VITALS
DIASTOLIC BLOOD PRESSURE: 64 MMHG | OXYGEN SATURATION: 100 % | HEART RATE: 82 BPM | TEMPERATURE: 99 F | SYSTOLIC BLOOD PRESSURE: 111 MMHG | RESPIRATION RATE: 18 BRPM

## 2023-11-22 VITALS
HEIGHT: 62 IN | HEART RATE: 99 BPM | SYSTOLIC BLOOD PRESSURE: 100 MMHG | DIASTOLIC BLOOD PRESSURE: 70 MMHG | OXYGEN SATURATION: 99 % | WEIGHT: 110.89 LBS | RESPIRATION RATE: 18 BRPM | TEMPERATURE: 98 F

## 2023-11-22 DIAGNOSIS — Z98.82 BREAST IMPLANT STATUS: Chronic | ICD-10-CM

## 2023-11-22 LAB
ALBUMIN SERPL ELPH-MCNC: 3.1 G/DL — LOW (ref 3.5–5)
ALBUMIN SERPL ELPH-MCNC: 3.1 G/DL — LOW (ref 3.5–5)
ALP SERPL-CCNC: 74 U/L — SIGNIFICANT CHANGE UP (ref 40–120)
ALP SERPL-CCNC: 74 U/L — SIGNIFICANT CHANGE UP (ref 40–120)
ALT FLD-CCNC: 20 U/L DA — SIGNIFICANT CHANGE UP (ref 10–60)
ALT FLD-CCNC: 20 U/L DA — SIGNIFICANT CHANGE UP (ref 10–60)
ANION GAP SERPL CALC-SCNC: 1 MMOL/L — LOW (ref 5–17)
ANION GAP SERPL CALC-SCNC: 1 MMOL/L — LOW (ref 5–17)
APPEARANCE UR: ABNORMAL
APPEARANCE UR: ABNORMAL
APTT BLD: 30.2 SEC — SIGNIFICANT CHANGE UP (ref 24.5–35.6)
APTT BLD: 30.2 SEC — SIGNIFICANT CHANGE UP (ref 24.5–35.6)
AST SERPL-CCNC: 11 U/L — SIGNIFICANT CHANGE UP (ref 10–40)
AST SERPL-CCNC: 11 U/L — SIGNIFICANT CHANGE UP (ref 10–40)
BACTERIA # UR AUTO: ABNORMAL /HPF
BACTERIA # UR AUTO: ABNORMAL /HPF
BASOPHILS # BLD AUTO: 0.03 K/UL — SIGNIFICANT CHANGE UP (ref 0–0.2)
BASOPHILS # BLD AUTO: 0.03 K/UL — SIGNIFICANT CHANGE UP (ref 0–0.2)
BASOPHILS NFR BLD AUTO: 0.3 % — SIGNIFICANT CHANGE UP (ref 0–2)
BASOPHILS NFR BLD AUTO: 0.3 % — SIGNIFICANT CHANGE UP (ref 0–2)
BILIRUB SERPL-MCNC: 0.4 MG/DL — SIGNIFICANT CHANGE UP (ref 0.2–1.2)
BILIRUB SERPL-MCNC: 0.4 MG/DL — SIGNIFICANT CHANGE UP (ref 0.2–1.2)
BILIRUB UR-MCNC: ABNORMAL
BILIRUB UR-MCNC: ABNORMAL
BUN SERPL-MCNC: 7 MG/DL — SIGNIFICANT CHANGE UP (ref 7–18)
BUN SERPL-MCNC: 7 MG/DL — SIGNIFICANT CHANGE UP (ref 7–18)
CALCIUM SERPL-MCNC: 8.6 MG/DL — SIGNIFICANT CHANGE UP (ref 8.4–10.5)
CALCIUM SERPL-MCNC: 8.6 MG/DL — SIGNIFICANT CHANGE UP (ref 8.4–10.5)
CHLORIDE SERPL-SCNC: 105 MMOL/L — SIGNIFICANT CHANGE UP (ref 96–108)
CHLORIDE SERPL-SCNC: 105 MMOL/L — SIGNIFICANT CHANGE UP (ref 96–108)
CO2 SERPL-SCNC: 31 MMOL/L — SIGNIFICANT CHANGE UP (ref 22–31)
CO2 SERPL-SCNC: 31 MMOL/L — SIGNIFICANT CHANGE UP (ref 22–31)
COLOR SPEC: ABNORMAL
COLOR SPEC: ABNORMAL
COMMENT - URINE: SIGNIFICANT CHANGE UP
COMMENT - URINE: SIGNIFICANT CHANGE UP
CREAT SERPL-MCNC: 0.68 MG/DL — SIGNIFICANT CHANGE UP (ref 0.5–1.3)
CREAT SERPL-MCNC: 0.68 MG/DL — SIGNIFICANT CHANGE UP (ref 0.5–1.3)
DIFF PNL FLD: ABNORMAL
DIFF PNL FLD: ABNORMAL
EGFR: 115 ML/MIN/1.73M2 — SIGNIFICANT CHANGE UP
EGFR: 115 ML/MIN/1.73M2 — SIGNIFICANT CHANGE UP
EOSINOPHIL # BLD AUTO: 0.14 K/UL — SIGNIFICANT CHANGE UP (ref 0–0.5)
EOSINOPHIL # BLD AUTO: 0.14 K/UL — SIGNIFICANT CHANGE UP (ref 0–0.5)
EOSINOPHIL NFR BLD AUTO: 1.3 % — SIGNIFICANT CHANGE UP (ref 0–6)
EOSINOPHIL NFR BLD AUTO: 1.3 % — SIGNIFICANT CHANGE UP (ref 0–6)
EPI CELLS # UR: PRESENT
EPI CELLS # UR: PRESENT
GLUCOSE SERPL-MCNC: 95 MG/DL — SIGNIFICANT CHANGE UP (ref 70–99)
GLUCOSE SERPL-MCNC: 95 MG/DL — SIGNIFICANT CHANGE UP (ref 70–99)
GLUCOSE UR QL: NEGATIVE MG/DL — SIGNIFICANT CHANGE UP
GLUCOSE UR QL: NEGATIVE MG/DL — SIGNIFICANT CHANGE UP
HCG SERPL-ACNC: <1 MIU/ML — SIGNIFICANT CHANGE UP
HCG SERPL-ACNC: <1 MIU/ML — SIGNIFICANT CHANGE UP
HCT VFR BLD CALC: 37.9 % — SIGNIFICANT CHANGE UP (ref 34.5–45)
HCT VFR BLD CALC: 37.9 % — SIGNIFICANT CHANGE UP (ref 34.5–45)
HGB BLD-MCNC: 11.5 G/DL — SIGNIFICANT CHANGE UP (ref 11.5–15.5)
HGB BLD-MCNC: 11.5 G/DL — SIGNIFICANT CHANGE UP (ref 11.5–15.5)
IMM GRANULOCYTES NFR BLD AUTO: 0.4 % — SIGNIFICANT CHANGE UP (ref 0–0.9)
IMM GRANULOCYTES NFR BLD AUTO: 0.4 % — SIGNIFICANT CHANGE UP (ref 0–0.9)
INR BLD: 1.13 RATIO — SIGNIFICANT CHANGE UP (ref 0.85–1.18)
INR BLD: 1.13 RATIO — SIGNIFICANT CHANGE UP (ref 0.85–1.18)
KETONES UR-MCNC: ABNORMAL MG/DL
KETONES UR-MCNC: ABNORMAL MG/DL
LACTATE SERPL-SCNC: 0.6 MMOL/L — LOW (ref 0.7–2)
LACTATE SERPL-SCNC: 0.6 MMOL/L — LOW (ref 0.7–2)
LEUKOCYTE ESTERASE UR-ACNC: ABNORMAL
LEUKOCYTE ESTERASE UR-ACNC: ABNORMAL
LIDOCAIN IGE QN: 29 U/L — SIGNIFICANT CHANGE UP (ref 13–75)
LIDOCAIN IGE QN: 29 U/L — SIGNIFICANT CHANGE UP (ref 13–75)
LYMPHOCYTES # BLD AUTO: 18.4 % — SIGNIFICANT CHANGE UP (ref 13–44)
LYMPHOCYTES # BLD AUTO: 18.4 % — SIGNIFICANT CHANGE UP (ref 13–44)
LYMPHOCYTES # BLD AUTO: 2.05 K/UL — SIGNIFICANT CHANGE UP (ref 1–3.3)
LYMPHOCYTES # BLD AUTO: 2.05 K/UL — SIGNIFICANT CHANGE UP (ref 1–3.3)
MAGNESIUM SERPL-MCNC: 2 MG/DL — SIGNIFICANT CHANGE UP (ref 1.6–2.6)
MAGNESIUM SERPL-MCNC: 2 MG/DL — SIGNIFICANT CHANGE UP (ref 1.6–2.6)
MCHC RBC-ENTMCNC: 23.8 PG — LOW (ref 27–34)
MCHC RBC-ENTMCNC: 23.8 PG — LOW (ref 27–34)
MCHC RBC-ENTMCNC: 30.3 GM/DL — LOW (ref 32–36)
MCHC RBC-ENTMCNC: 30.3 GM/DL — LOW (ref 32–36)
MCV RBC AUTO: 78.3 FL — LOW (ref 80–100)
MCV RBC AUTO: 78.3 FL — LOW (ref 80–100)
MONOCYTES # BLD AUTO: 1.05 K/UL — HIGH (ref 0–0.9)
MONOCYTES # BLD AUTO: 1.05 K/UL — HIGH (ref 0–0.9)
MONOCYTES NFR BLD AUTO: 9.4 % — SIGNIFICANT CHANGE UP (ref 2–14)
MONOCYTES NFR BLD AUTO: 9.4 % — SIGNIFICANT CHANGE UP (ref 2–14)
NEUTROPHILS # BLD AUTO: 7.85 K/UL — HIGH (ref 1.8–7.4)
NEUTROPHILS # BLD AUTO: 7.85 K/UL — HIGH (ref 1.8–7.4)
NEUTROPHILS NFR BLD AUTO: 70.2 % — SIGNIFICANT CHANGE UP (ref 43–77)
NEUTROPHILS NFR BLD AUTO: 70.2 % — SIGNIFICANT CHANGE UP (ref 43–77)
NITRITE UR-MCNC: NEGATIVE — SIGNIFICANT CHANGE UP
NITRITE UR-MCNC: NEGATIVE — SIGNIFICANT CHANGE UP
NRBC # BLD: 0 /100 WBCS — SIGNIFICANT CHANGE UP (ref 0–0)
NRBC # BLD: 0 /100 WBCS — SIGNIFICANT CHANGE UP (ref 0–0)
PH UR: 5.5 — SIGNIFICANT CHANGE UP (ref 5–8)
PH UR: 5.5 — SIGNIFICANT CHANGE UP (ref 5–8)
PHOSPHATE SERPL-MCNC: 2.6 MG/DL — SIGNIFICANT CHANGE UP (ref 2.5–4.5)
PHOSPHATE SERPL-MCNC: 2.6 MG/DL — SIGNIFICANT CHANGE UP (ref 2.5–4.5)
PLATELET # BLD AUTO: 237 K/UL — SIGNIFICANT CHANGE UP (ref 150–400)
PLATELET # BLD AUTO: 237 K/UL — SIGNIFICANT CHANGE UP (ref 150–400)
POTASSIUM SERPL-MCNC: 4.1 MMOL/L — SIGNIFICANT CHANGE UP (ref 3.5–5.3)
POTASSIUM SERPL-MCNC: 4.1 MMOL/L — SIGNIFICANT CHANGE UP (ref 3.5–5.3)
POTASSIUM SERPL-SCNC: 4.1 MMOL/L — SIGNIFICANT CHANGE UP (ref 3.5–5.3)
POTASSIUM SERPL-SCNC: 4.1 MMOL/L — SIGNIFICANT CHANGE UP (ref 3.5–5.3)
PROT SERPL-MCNC: 7.4 G/DL — SIGNIFICANT CHANGE UP (ref 6–8.3)
PROT SERPL-MCNC: 7.4 G/DL — SIGNIFICANT CHANGE UP (ref 6–8.3)
PROT UR-MCNC: 300 MG/DL
PROT UR-MCNC: 300 MG/DL
PROTHROM AB SERPL-ACNC: 12.8 SEC — SIGNIFICANT CHANGE UP (ref 9.5–13)
PROTHROM AB SERPL-ACNC: 12.8 SEC — SIGNIFICANT CHANGE UP (ref 9.5–13)
RBC # BLD: 4.84 M/UL — SIGNIFICANT CHANGE UP (ref 3.8–5.2)
RBC # BLD: 4.84 M/UL — SIGNIFICANT CHANGE UP (ref 3.8–5.2)
RBC # FLD: 15.4 % — HIGH (ref 10.3–14.5)
RBC # FLD: 15.4 % — HIGH (ref 10.3–14.5)
RBC CASTS # UR COMP ASSIST: 20 /HPF — HIGH (ref 0–4)
RBC CASTS # UR COMP ASSIST: 20 /HPF — HIGH (ref 0–4)
SODIUM SERPL-SCNC: 137 MMOL/L — SIGNIFICANT CHANGE UP (ref 135–145)
SODIUM SERPL-SCNC: 137 MMOL/L — SIGNIFICANT CHANGE UP (ref 135–145)
SP GR SPEC: 1.02 — SIGNIFICANT CHANGE UP (ref 1–1.03)
SP GR SPEC: 1.02 — SIGNIFICANT CHANGE UP (ref 1–1.03)
TROPONIN I, HIGH SENSITIVITY RESULT: 4.3 NG/L — SIGNIFICANT CHANGE UP
TROPONIN I, HIGH SENSITIVITY RESULT: 4.3 NG/L — SIGNIFICANT CHANGE UP
UROBILINOGEN FLD QL: 1 E.U./DL — SIGNIFICANT CHANGE UP (ref 0.2–1)
UROBILINOGEN FLD QL: 1 E.U./DL — SIGNIFICANT CHANGE UP (ref 0.2–1)
WBC # BLD: 11.16 K/UL — HIGH (ref 3.8–10.5)
WBC # BLD: 11.16 K/UL — HIGH (ref 3.8–10.5)
WBC # FLD AUTO: 11.16 K/UL — HIGH (ref 3.8–10.5)
WBC # FLD AUTO: 11.16 K/UL — HIGH (ref 3.8–10.5)
WBC UR QL: 10 /HPF — HIGH (ref 0–5)
WBC UR QL: 10 /HPF — HIGH (ref 0–5)

## 2023-11-22 PROCEDURE — 83735 ASSAY OF MAGNESIUM: CPT

## 2023-11-22 PROCEDURE — 87086 URINE CULTURE/COLONY COUNT: CPT

## 2023-11-22 PROCEDURE — 85610 PROTHROMBIN TIME: CPT

## 2023-11-22 PROCEDURE — 83605 ASSAY OF LACTIC ACID: CPT

## 2023-11-22 PROCEDURE — 74177 CT ABD & PELVIS W/CONTRAST: CPT | Mod: MA

## 2023-11-22 PROCEDURE — 36415 COLL VENOUS BLD VENIPUNCTURE: CPT

## 2023-11-22 PROCEDURE — 96365 THER/PROPH/DIAG IV INF INIT: CPT | Mod: XU

## 2023-11-22 PROCEDURE — 84702 CHORIONIC GONADOTROPIN TEST: CPT

## 2023-11-22 PROCEDURE — 84100 ASSAY OF PHOSPHORUS: CPT

## 2023-11-22 PROCEDURE — 93005 ELECTROCARDIOGRAM TRACING: CPT

## 2023-11-22 PROCEDURE — 83690 ASSAY OF LIPASE: CPT

## 2023-11-22 PROCEDURE — 85025 COMPLETE CBC W/AUTO DIFF WBC: CPT

## 2023-11-22 PROCEDURE — 84484 ASSAY OF TROPONIN QUANT: CPT

## 2023-11-22 PROCEDURE — 80053 COMPREHEN METABOLIC PANEL: CPT

## 2023-11-22 PROCEDURE — 99285 EMERGENCY DEPT VISIT HI MDM: CPT

## 2023-11-22 PROCEDURE — 96375 TX/PRO/DX INJ NEW DRUG ADDON: CPT

## 2023-11-22 PROCEDURE — 99285 EMERGENCY DEPT VISIT HI MDM: CPT | Mod: 25

## 2023-11-22 PROCEDURE — 85730 THROMBOPLASTIN TIME PARTIAL: CPT

## 2023-11-22 PROCEDURE — 81001 URINALYSIS AUTO W/SCOPE: CPT

## 2023-11-22 PROCEDURE — 74177 CT ABD & PELVIS W/CONTRAST: CPT | Mod: 26,MA

## 2023-11-22 RX ORDER — CIPROFLOXACIN LACTATE 400MG/40ML
500 VIAL (ML) INTRAVENOUS ONCE
Refills: 0 | Status: COMPLETED | OUTPATIENT
Start: 2023-11-22 | End: 2023-11-22

## 2023-11-22 RX ORDER — METRONIDAZOLE 500 MG
1 TABLET ORAL
Qty: 21 | Refills: 0
Start: 2023-11-22 | End: 2023-11-28

## 2023-11-22 RX ORDER — ONDANSETRON 8 MG/1
1 TABLET, FILM COATED ORAL
Qty: 6 | Refills: 0
Start: 2023-11-22

## 2023-11-22 RX ORDER — SODIUM CHLORIDE 9 MG/ML
1000 INJECTION, SOLUTION INTRAVENOUS ONCE
Refills: 0 | Status: COMPLETED | OUTPATIENT
Start: 2023-11-22 | End: 2023-11-22

## 2023-11-22 RX ORDER — METRONIDAZOLE 500 MG
500 TABLET ORAL ONCE
Refills: 0 | Status: COMPLETED | OUTPATIENT
Start: 2023-11-22 | End: 2023-11-22

## 2023-11-22 RX ORDER — CIPROFLOXACIN LACTATE 400MG/40ML
1 VIAL (ML) INTRAVENOUS
Qty: 14 | Refills: 0
Start: 2023-11-22 | End: 2023-11-28

## 2023-11-22 RX ORDER — ACETAMINOPHEN 500 MG
750 TABLET ORAL ONCE
Refills: 0 | Status: COMPLETED | OUTPATIENT
Start: 2023-11-22 | End: 2023-11-22

## 2023-11-22 RX ORDER — ONDANSETRON 8 MG/1
4 TABLET, FILM COATED ORAL ONCE
Refills: 0 | Status: COMPLETED | OUTPATIENT
Start: 2023-11-22 | End: 2023-11-22

## 2023-11-22 RX ADMIN — Medication 500 MILLIGRAM(S): at 18:24

## 2023-11-22 RX ADMIN — SODIUM CHLORIDE 1000 MILLILITER(S): 9 INJECTION, SOLUTION INTRAVENOUS at 15:33

## 2023-11-22 RX ADMIN — SODIUM CHLORIDE 1000 MILLILITER(S): 9 INJECTION, SOLUTION INTRAVENOUS at 16:33

## 2023-11-22 RX ADMIN — ONDANSETRON 4 MILLIGRAM(S): 8 TABLET, FILM COATED ORAL at 15:34

## 2023-11-22 RX ADMIN — Medication 750 MILLIGRAM(S): at 16:33

## 2023-11-22 RX ADMIN — Medication 300 MILLIGRAM(S): at 15:33

## 2023-11-22 RX ADMIN — Medication 500 MILLIGRAM(S): at 18:23

## 2023-11-22 NOTE — ED PROVIDER NOTE - CLINICAL SUMMARY MEDICAL DECISION MAKING FREE TEXT BOX
Jeanette: 38-year-old female with no pertinent past medical history, surgical history significant for tubal ligation presents with vomiting and diarrhea since last night.  Patient states she was sick approximately 1 week ago with vomiting, diarrhea, body aches, cough, and congestion.  Patient states vomiting diarrhea lasted approximately 1 day and resolved, reports vomiting and diarrhea returning last night.  Patient ports 3 episodes of nonbloody nonbilious emesis and approximately 3 episodes of watery diarrhea.  Patient states she had syncopal episode while on the train 6 days ago, denies any head strike or injuries.  Denies any fevers, chest pain, shortness of breath, bloody stools, black tarry stools, dysuria, numbness, focal weakness, or rash.  Denies any ill contacts, recent travel, or food related triggers.  Physical exam per above. Abdomen tender over RLQ. No evidence of a cardiac arrythmia such as [place], WPW, HOCM, long or short QT.  Neurologic exam is nonfocal, not c/w CVA or primary neurologic abnormality. Will obtain labs, imaging r/o appendicitis r/o diverticulitis r/o colitis with dispo pending workup.

## 2023-11-22 NOTE — ED PROVIDER NOTE - NSFOLLOWUPINSTRUCTIONS_ED_ALL_ED_FT
Colitis  Colitis       La colitis es la inflamación del colon. La colitis puede durar un breve período (ser aguda) o prolongarse mucho tiempo (volverse crónica).    ¿Cuáles son las causas?  Esta afección puede ser causada por lo siguiente:    Virus.  Bacterias.  Reacciones a los medicamentos.  Algunas enfermedades autoinmunitarias, tony la enfermedad de Crohn y la colitis ulcerosa.  Radioterapia.  Disminución de la irrigación sanguínea al intestino (isquemia).    ¿Cuáles son los signos o los síntomas?  Los síntomas de esta afección incluyen:    Diarrea acuosa.  Heces sanguinolentas o alquitranadas.  Dolor.  Fiebre.  Vómitos.  Cansancio (fatiga).  Pérdida de peso.  Meteorismo.  Dolor abdominal.  Menos deposiciones que lo habitual.  Tay necesidad hoiwe y repentina de tener deposiciones.  Sentir que el intestino no se vacía después de las deposiciones.    ¿Cómo se diagnostica?  Esta afección se diagnostica mediante un análisis de heces o de joseph.    También pueden hacerle otros estudios, por ejemplo:    Radiografías.  Tay exploración por tomografía computarizada (TC).  Colonoscopía.  Endoscopía.  Biopsia.    ¿Cómo se trata?  El tratamiento de esta afección depende de la causa. El tratamiento de esta afección puede incluir:    Dejar descansar a los intestinos. [place] implica no consumir alimentos ni líquidos shawn un tiempo.  Administración de líquidos por vía intravenosa.  Medicamentos para el dolor y la diarrea.  Antibióticos.  Medicamentos con cortisona.  Tay cirugía.    Siga estas indicaciones en caldera casa:      Comida y bebida     Siga las indicaciones del médico respecto de las restricciones en las comidas o las bebidas.  Frieda suficiente líquido tony para mantener la orina de color amarillo pálido.  Trabaje con un nutricionista para determinar cuáles son los alimentos que reagudizan la afección.  Evite los alimentos que reagudizan la afección.  Mantenga tay dieta tori balanceada.        Indicaciones generales    Si le recetaron un antibiótico, tómelo tony se lo haya indicado el médico. No deje de kevin los antibióticos aunque comience a sentirse mejor.  [place] los medicamentos de venta paulo y los recetados solamente tony se lo haya indicado el médico.  Concurra a todas las visitas de seguimiento tony se lo haya indicado el médico. [place] es importante.    Comuníquese con un médico si:  Los síntomas no desaparecen.  Tiene nuevos síntomas.    Solicite ayuda inmediatamente si:  Tiene fiebre que no desaparece con el tratamiento.  Tiene escalofríos.  Sufre debilidad extrema, desmayos o deshidratación.  Orona vomitado repetidas veces.  Siente dolor intenso en el abdomen.  Ingris heces son sanguinolentas o alquitranadas.    Resumen  La colitis es la inflamación del colon. La colitis puede durar un breve período (ser aguda) o prolongarse mucho tiempo (volverse crónica).  El tratamiento de esta afección depende de la causa y puede incluir descanso de los intestinos, kevin medicamentos o someterse a tay cirugía.  Si le recetaron un antibiótico, tómelo tony se lo haya indicado el médico. No deje de kevin los antibióticos aunque comience a sentirse mejor.  Solicite ayuda de inmediato si tiene dolor intenso en el abdomen.  Concurra a todas las visitas de seguimiento tony se lo haya indicado el médico. [place] es importante.

## 2023-11-22 NOTE — ED PROVIDER NOTE - PATIENT PORTAL LINK FT
You can access the FollowMyHealth Patient Portal offered by [facility] by registering at the following website: [email or web address]. By joining [employer]’s FollowMyHealth portal, you will also be able to view your health information using other applications (apps) compatible with our system.

## 2023-11-22 NOTE — ED PROVIDER NOTE - NSFOLLOWUPCLINICS_GEN_ALL_ED_FT
[facility]  Gastroenterology  [address] [place] [address]  Phone: [phone]  Fax: [phone]

## 2023-11-22 NOTE — ED PROVIDER NOTE - PHYSICAL EXAMINATION
Pt sent in by Dr. Leong, has sutured wound to posterior knee "that needs to be cleaned, and stitched again".
CONSTITUTIONAL: non-toxic, well appearing  SKIN: no rash, no petechiae.  EYES: PERRL, EOMI, pink conjunctiva, anicteric  ENT: tongue and uvular midline, no exudates, moist mucous membranes  NECK: Supple; no meningismus, no JVD  CARD: RRR, no murmurs, equal radial pulses bilaterally 2+  RESP: CTAB, no respiratory distress  ABD: Soft, tender over RLQ, non-distended, no peritoneal signs, no CVA tenderness  EXT: Normal ROM x4. No edema.   NEURO: Alert, oriented. Neuro exam nonfocal  PSYCH: Cooperative, appropriate.

## 2023-11-22 NOTE — ED PROVIDER NOTE - OBJECTIVE STATEMENT
#[id number]    38-year-old female with no pertinent past medical history, surgical history significant for tubal ligation presents with vomiting and diarrhea since last night.  Patient states she was sick approximately 1 week ago with vomiting, diarrhea, body aches, cough, and congestion.  Patient states vomiting diarrhea lasted approximately 1 day and resolved, reports vomiting and diarrhea returning last night.  Patient ports 3 episodes of nonbloody nonbilious emesis and approximately 3 episodes of watery diarrhea.  Patient states she had syncopal episode while on the train 6 days ago, denies any head strike or injuries.  Denies any fevers, chest pain, shortness of breath, bloody stools, black tarry stools, dysuria, numbness, focal weakness, or rash.  Denies any ill contacts, recent travel, or food related triggers.  Denies any additional complaints.

## 2023-11-22 NOTE — ED ADULT NURSE NOTE - OBJECTIVE STATEMENT
AOX4 +ambulatory patient reports syncopal episode 2 weeks ago complaining of abdominal pain with diarrhea. No fevers or chills

## 2023-11-22 NOTE — ED PROVIDER NOTE - PROGRESS NOTE DETAILS
Chidi-: pt seen and re-evaluated at bedside.  Pt states their symptoms have improved.  Pt comfortable in NAD.  Discussed lab/imaging results with pt. Pt states she feels "much better." Will DC with GI follow up, return precautions discussed, pt understood and agreeable with plan.

## 2023-12-07 ENCOUNTER — NON-APPOINTMENT (OUTPATIENT)
Age: 38
End: 2023-12-07

## 2024-02-12 ENCOUNTER — APPOINTMENT (OUTPATIENT)
Dept: GASTROENTEROLOGY | Facility: CLINIC | Age: 39
End: 2024-02-12
Payer: COMMERCIAL

## 2024-02-12 VITALS
HEART RATE: 64 BPM | BODY MASS INDEX: 20.02 KG/M2 | TEMPERATURE: 98.3 F | SYSTOLIC BLOOD PRESSURE: 117 MMHG | OXYGEN SATURATION: 100 % | WEIGHT: 113 LBS | HEIGHT: 63 IN | DIASTOLIC BLOOD PRESSURE: 75 MMHG

## 2024-02-12 DIAGNOSIS — K59.00 CONSTIPATION, UNSPECIFIED: ICD-10-CM

## 2024-02-12 DIAGNOSIS — R10.32 RIGHT LOWER QUADRANT PAIN: ICD-10-CM

## 2024-02-12 DIAGNOSIS — Z78.9 OTHER SPECIFIED HEALTH STATUS: ICD-10-CM

## 2024-02-12 DIAGNOSIS — R93.89 ABNORMAL FINDINGS ON DIAGNOSTIC IMAGING OF OTHER SPECIFIED BODY STRUCTURES: ICD-10-CM

## 2024-02-12 DIAGNOSIS — R10.31 RIGHT LOWER QUADRANT PAIN: ICD-10-CM

## 2024-02-12 DIAGNOSIS — K52.9 NONINFECTIVE GASTROENTERITIS AND COLITIS, UNSPECIFIED: ICD-10-CM

## 2024-02-12 DIAGNOSIS — Z83.3 FAMILY HISTORY OF DIABETES MELLITUS: ICD-10-CM

## 2024-02-12 DIAGNOSIS — D64.9 ANEMIA, UNSPECIFIED: ICD-10-CM

## 2024-02-12 PROCEDURE — 99214 OFFICE O/P EST MOD 30 MIN: CPT

## 2024-02-12 RX ORDER — SIMETHICONE 125 MG/1
125 TABLET, CHEWABLE ORAL
Qty: 120 | Refills: 3 | Status: ACTIVE | COMMUNITY
Start: 2024-02-12 | End: 1900-01-01

## 2024-02-12 RX ORDER — DICYCLOMINE HYDROCHLORIDE 10 MG/1
10 CAPSULE ORAL
Qty: 90 | Refills: 3 | Status: ACTIVE | COMMUNITY
Start: 2024-02-12 | End: 1900-01-01

## 2024-02-12 RX ORDER — POLYETHYLENE GLYCOL 3350 AND ELECTROLYTES WITH LEMON FLAVOR 236; 22.74; 6.74; 5.86; 2.97 G/4L; G/4L; G/4L; G/4L; G/4L
236 POWDER, FOR SOLUTION ORAL
Qty: 1 | Refills: 0 | Status: ACTIVE | COMMUNITY
Start: 2024-02-12 | End: 1900-01-01

## 2024-02-12 RX ORDER — LINACLOTIDE 145 UG/1
145 CAPSULE, GELATIN COATED ORAL
Qty: 90 | Refills: 3 | Status: ACTIVE | COMMUNITY
Start: 2024-02-12 | End: 1900-01-01

## 2024-02-14 NOTE — HISTORY OF PRESENT ILLNESS
[FreeTextEntry1] : The patient is a 38-year-old  female with past medical history significant for anemia who was referred to my office by Dr. ABIGAIL Palmer ([phone]) for abdominal pain, alternating diarrhea/constipation, change in bowel habits and change in caliber of stool. The patient also admits to having dyspepsia, nausea/vomiting and weight loss. I was asked to render an opinion for consultation for the above complaints.   The patient states that she is feeling uncomfortable x several years.  The patient was evaluated at [facility] emergency room on 2023 for abdominal pain.  The patient had a history of tubal ligation.  We developed a 1 week history of abdominal pain, nausea/vomiting, diarrhea, body aches, cough and congestion the patient had blood work and imaging studies performed in the emergency room to assess the symptoms.  The blood work performed on 2023 revealed an elevated WBC count of 11.16 K/UL, no evidence of anemia with a hemoglobin/hematocrit level of 11.5/37.9, respectively, a normal PTT/INR/PTT of 12.8/1.13/30.2, respectively, a low albumin level of 3.1 g/dL, normal liver enzymes with a total bilirubin of 0.4 mg/dL, a normal alkaline phosphatase/AST/ALT of 74/11/20 2/L, respectively a low lactate level of 0.6 mmol/L, a normal troponin level of 4.3 ng/L, a normal magnesium level of 2.0 mg/dL, normal phosphorus level of 2.6 mg/dL and a normal beta hCG of <1 mIU/mL.  The urinalysis performed on 2023 revealed a turbid appearance, 300 mg/dL of proteinuria, trace ketones, large amount of blood in the urine, small bilirubinemia, moderate leukocyte Estrace concentration, 10 WBCs per high-power field, 20 RBCs per high-power field, many bacteria and squamous epithelial cells present.  The urine culture performed on 2023 revealed <10,000 CFU/mL of normal urogenital jeannie.  The EKG performed on 2023 revealed normal sinus rhythm with nonspecific T wave abnormalities at a rate of 67 bpm.  The CAT scan of the abdomen pelvis with IV contrast performed on 2023 revealed wall thickening of the cecum which may be due to inflammatory bowel disease or other etiology for colitis, cluster of small lymph nodes in the right lower quadrant that may be reactive, appendix within normal limits, mildly distended tubular structure in the right lower quadrant may represent a hydrosalpinx.  The previously visualized inflamed tubular structure is no longer identified.  Also noted was multiple soft tissue nodules and stranding in the buttocks bilaterally which are unchanged and bilateral breast prosthesis.  The patient was treated with IV fluids, acetaminophen, ondansetron with improvement of the symptoms.  The patient was discharged from the emergency room with a diagnosis of colitis and advised to follow-up in the office for further workup and treatment. The patient was evaluated at [facility] emergency room on 2023 for right lower quadrant pain.  The patient had blood work and imaging studies performed in the emergency room to assess the symptoms.  The blood work performed on 2023 revealed an elevated WBC count of 15.93 K/UL, anemia with a hemoglobin/hematocrit level of 8.6/29.3, respectively, and elevated platelet count of 408,000, and elevated total protein level of 8.4 g/dL, a low albumin level of 344 g/dL, normal liver enzymes with total bilirubin of 0.2 mg/dL, a normal alkaline phosphatase/AST/ALT of 73/15/16 U/L, respectively, a normal lipase level of 135 U/L and a normal lactate level of 1.1 mmol/L.  The beta hCG was normal at less than 7 1 mIU/mL.  The urinalysis performed on 2023 revealed proteinuria of 30 mg/dL, trace blood in the urine, 4 urobilinogen moderate leukocyte Estrace concentration, 11-25 WBCs per high-power field, 2-5 RBCs per high-power field, moderate bacteria and moderate squamous epithelial cells.  The CAT scan of the abdomen pelvis with IV contrast performed on 2023 revealed no bowel obstruction or grossly thickened bowel wall.  Appendix appears unremarkable, maintains normal caliber and contains gas.  Apparent fluid-filled tubular structure in the right adnexa with enhancing wall, adjacent stranding and mild adjacent free fluid may represent a tubular ovarian abscess.  And small ascites in the right lower quadrant.  Also noted was small fat-containing umbilical hernia, multiple subcutaneous nodules in the buttocks bilaterally may represent injection granulomas, mild calcified atherosclerotic disease, a corpus luteal cyst, a small hypodense lesion in the right kidney too small to characterize, bilateral extrarenal pelvises, partially visualized breast bilateral implants.  The pelvic ultrasound performed on 2023 revealed 5.3 cm right adnexal dilated tubular structure corresponding to abnormality on CT scan that likely represents a tubo-ovarian abscess.    The patient was evaluated by gynecology during the emergency room evaluation the patient underwent a cervical culture and was treated with cefotetan and in the emergency room.  The patient was discharged with doxycycline 100 mg twice a day for 10 days.  The patient was discharged with a diagnosis of tubo-ovarian abscess and advised to follow-up with gynecology after discharge.  The patient complains of abdominal pain.  The patient describes the abdominal pain as a sharp, crampy, intermittent lower abdominal discomfort that is nonradiating in nature.  The abdominal pain is unrelated to meals or stress.  The abdominal pain improves with passing gas or having a bowel movement.  The abdominal pain is described as mild in nature.  The abdominal pain occurs at night and in the morning.  The abdominal pain can occur at any time.   The abdominal pain never has awakened the patient from sleep.  The abdominal pain is not relieved with any medications.  The abdominal pain is associated with abdominal gas and bloating.  The patient complains of occasional nausea and vomiting that resolved spontaneously.  She currently denies any nausea or vomiting.  The patient denies any gastroesophageal reflux disease or dysphagia. The patient denies any atypical chest pain, shortness of breath or palpitations.  The patient denies any diaphoresis. The patient admits to occasional episodes of diaphoresis. The patient complains of alternating diarrhea/constipation.  The patient has 1 to 3 bowel movements every 1 to 7 days. The diarrhea is described as soft to watery in nature.   The patient complains of a change in bowel habits.  The patient complains of a change in caliber of stool.   The patient admits to having mucus discharge with the bowel movements.  The patient denies any bright red blood per rectum, melena or hematemesis.  The patient denies any rectal pain or rectal pruritus. The patient complains of weight loss but denies any anorexia.  The patient admits to losing 10 pounds over the past 1 year. The patient attributes the weight loss to change in diet. She denies any fevers or chills.  The patient denies any jaundice or pruritus.  The patient denies any back pain.  The patient denies ever having a prior upper endoscopy and colonoscopy performed by another gastroenterologist.  The patient's last menstrual period was on 2024. The patient's periods are regular at 28 to 30 days.  The patient's menstrual periods are heavy for 5 to 6 days.  The patient is a .  The patient's first menstrual period was at age 11. The patient denies any significant family history of GI problems.    (-) smoking, (-) ETOH, (-) IVDA  Physical Exam: General Appearance: Well developed, well nourished, no acute distress ENT:  nose clear, ears unremarkable Eyes: No enteric sclera, conjunctiva clear. Neck: Supple, without masses  Respiratory: Breath sounds equal and bilateral, no wheezing no rales or rhonchi Cardiovascular: S1-S2 audible, no murmur, no rubs or gallops GI: (+) BS, soft, tender diffusely, no rebound, no guarding, no masses Liver: liver edge palpated Rectal: not done Musculo-skeletal: Good motor strength, good range of motion, normal appearing extremities Skin: Normal appearing skin, no jaundice, no rashes or nodules Neurological: without focal motor or sensory deficits Patient is moving all extremities spontaneously and to command with normal muscle strength alert and oriented X3 Psychiatric: Good affect, not depressed, not anxious  [de-identified] : The CAT scan of the abdomen and pelvis with IV contrast performed on November 22, 2023 revealed wall thickening of the cecum which may be due to inflammatory bowel disease or other etiology for colitis, cluster of small lymph nodes in the right lower quadrant that may be reactive, appendix within normal limits, mildly distended tubular structure in the right lower quadrant may represent a hydrosalpinx.  The previously visualized inflamed tubular structure is no longer identified.  Also noted was multiple soft tissue nodules and stranding in the buttocks bilaterally which are unchanged and bilateral breast prosthesis.    The CAT scan of the abdomen pelvis with IV contrast performed on June 14, 2023 revealed no bowel obstruction or grossly thickened bowel wall.  Appendix appears unremarkable, maintains normal caliber and contains gas.  Apparent fluid-filled tubular structure in the right adnexa with enhancing wall, adjacent stranding and mild adjacent free fluid may represent a tubular ovarian abscess.  And small ascites in the right lower quadrant.  Also noted was small fat-containing umbilical hernia, multiple subcutaneous nodules in the buttocks bilaterally may represent injection granulomas, mild calcified atherosclerotic disease, a corpus luteal cyst, a small hypodense lesion in the right kidney too small to characterize, bilateral extrarenal pelvises, partially visualized breast bilateral implants.   [de-identified] : The pelvic ultrasound performed on June 14, 2023 revealed 5.3 cm right adnexal dilated tubular structure corresponding to abnormality on CT scan that likely represents a tubo-ovarian abscess.

## 2024-02-14 NOTE — ASSESSMENT
[FreeTextEntry1] : Anemia: The patient was found to have anemia on blood work.  The patient denies any bright red blood per rectum, melena or hematemesis.  The patient was guaiac-negative on physical exam.  I recommend a colonoscopy to assess the anemia.  The patient was told of the risks and benefits of the procedure.  The patient was told of the risks of perforation, emergency surgery, bleeding, infections and missed lesions.  The patient agreed and will schedule for the procedure. The patient is to be n.p.o. after midnight and bowel prep was given.  The patient is to return for the procedure.   Abdominal Pain: The patient complains of abdominal pain. The patient is to avoid nonsteroidal anti-inflammatory drugs and aspirin.  I recommend a low FOD-MAP diet.  I recommend a trial of Dicyclomine 10 mg tablet PO 3 times a day PRN for the abdominal pain. Dyspepsia: The patient complains of dyspeptic symptoms.  The patient was advised to abide by an anti-gas (low FOD-MAP) diet.  The patient was given a pamphlet for anti-gas (low FOD-MAP).  The patient and I reviewed the anti-gas (low FOD-MAP) diet at length. The patient is to start on a trial of Simethicone one tablet 4 times a day p.r.n. abdominal pain and gas. Nausea/Vomiting: The patient complains of nausea/vomiting. If the symptoms of nausea/vomiting persists, the patient may require a trial of Zofran 4 mg twice a day. Alternating Diarrhea/Constipation: The patient complains of alternating diarrhea/constipation.  I recommend a low residue diet. The patient is to avoid fiber supplementation. The patient is to consider starting a trial of a probiotic such as Align once a day.   I recommend a trial of Linzess 145 mcg once a day for the constipation.  If the symptoms persist, the patient may require a colonoscopy to assess for colitis versus other causes.  The patient was told of the risks and benefits of the procedure.  The patient was told of the risks of perforation, emergency surgery, bleeding, infections and missed lesions.  The patient agreed and will follow-up to reassess the symptoms. I recommend a stool guaiac to assess for occult blood in the stool. Abnormal Imaging Study: The CAT scan of the abdomen and pelvis with IV contrast performed on November 22, 2023 revealed wall thickening of the cecum which may be due to inflammatory bowel disease or other etiology for colitis, cluster of small lymph nodes in the right lower quadrant that may be reactive, appendix within normal limits, mildly distended tubular structure in the right lower quadrant may represent a hydrosalpinx.  The previously visualized inflamed tubular structure is no longer identified.  Also noted was multiple soft tissue nodules and stranding in the buttocks bilaterally which are unchanged and bilateral breast prosthesis.   Prior ER Evaluation: [place] [facility] [place] pain, diarrhea, nausea and vomiting.   The patient had blood work and imaging studies to assess the symptoms.  I reviewed the blood work and imaging studies performed in the emergency room. Colonoscopy: I recommend a colonoscopy to assess the symptoms and for colonic polyps and colitis.  The patient was told of the risks and benefits of the procedure.  The patient was told of the risks of perforation, emergency surgery, bleeding, infections and missed lesions.  The patient is to be on a clear liquid diet the entire day prior to the procedure. The patient is to complete the entire prescribed bowel prep the day prior to the procedure as directed. The patient is told not to drive, drink alcohol, use recreational drugs, exercise, or work the day of the procedure.  The patient was told of the need for an escort to accompany the patient home after the procedure. The patient is aware that the procedure may be cancelled if they fail to follow the directions.  The patient agreed and will schedule for the procedure. The patient is to be n.p.o. after midnight and bowel prep was given.  The patient is to return for the procedure. Follow-up: The patient is to follow-up in the office in 4 weeks to reassess the symptoms. The patient was told to call the office if any further problems.

## 2024-02-29 NOTE — ED PROVIDER NOTE - CAS EDP CONSULT WILL SEE PT
Informed Consent for Blood Component Transfusion Note    I have discussed with the patient the rationale for blood component transfusion; its benefits in treating or preventing fatigue, organ damage, or death; and its risk which includes mild transfusion reactions, rare risk of blood borne infection, or more serious but rare reactions. I have discussed the alternatives to transfusion, including the risk and consequences of not receiving transfusion. The patient had an opportunity to ask questions and had agreed to proceed with transfusion of blood components.    Electronically signed by Kendra Murray MD on 2/29/24 at 11:10 AM EST  
tomorrow

## 2024-06-20 ENCOUNTER — TRANSCRIPTION ENCOUNTER (OUTPATIENT)
Age: 39
End: 2024-06-20

## 2024-06-20 ENCOUNTER — OUTPATIENT (OUTPATIENT)
Dept: OUTPATIENT SERVICES | Facility: HOSPITAL | Age: 39
LOS: 1 days | End: 2024-06-20
Payer: MEDICAID

## 2024-06-20 ENCOUNTER — APPOINTMENT (OUTPATIENT)
Dept: GASTROENTEROLOGY | Facility: HOSPITAL | Age: 39
End: 2024-06-20

## 2024-06-20 ENCOUNTER — RESULT REVIEW (OUTPATIENT)
Age: 39
End: 2024-06-20

## 2024-06-20 VITALS
SYSTOLIC BLOOD PRESSURE: 99 MMHG | OXYGEN SATURATION: 99 % | HEART RATE: 71 BPM | RESPIRATION RATE: 18 BRPM | DIASTOLIC BLOOD PRESSURE: 60 MMHG

## 2024-06-20 VITALS
TEMPERATURE: 98 F | OXYGEN SATURATION: 96 % | SYSTOLIC BLOOD PRESSURE: 105 MMHG | HEIGHT: 63 IN | RESPIRATION RATE: 14 BRPM | WEIGHT: 119.93 LBS | DIASTOLIC BLOOD PRESSURE: 57 MMHG | HEART RATE: 79 BPM

## 2024-06-20 DIAGNOSIS — R93.89 ABNORMAL FINDINGS ON DIAGNOSTIC IMAGING OF OTHER SPECIFIED BODY STRUCTURES: ICD-10-CM

## 2024-06-20 DIAGNOSIS — Z98.82 BREAST IMPLANT STATUS: Chronic | ICD-10-CM

## 2024-06-20 LAB — HCG UR QL: NEGATIVE — SIGNIFICANT CHANGE UP

## 2024-06-20 PROCEDURE — 45380 COLONOSCOPY AND BIOPSY: CPT

## 2024-06-20 PROCEDURE — 88305 TISSUE EXAM BY PATHOLOGIST: CPT | Mod: 26

## 2024-06-20 PROCEDURE — 81025 URINE PREGNANCY TEST: CPT

## 2024-06-20 PROCEDURE — 88305 TISSUE EXAM BY PATHOLOGIST: CPT

## 2024-06-20 RX ORDER — SODIUM CHLORIDE 0.9 % (FLUSH) 0.9 %
500 SYRINGE (ML) INJECTION
Refills: 0 | Status: COMPLETED | OUTPATIENT
Start: 2024-06-20 | End: 2024-06-20

## 2024-06-20 RX ADMIN — Medication 30 MILLILITER(S): at 09:19

## 2024-06-24 LAB — SURGICAL PATHOLOGY STUDY: SIGNIFICANT CHANGE UP

## 2024-07-22 ENCOUNTER — NON-APPOINTMENT (OUTPATIENT)
Age: 39
End: 2024-07-22

## 2024-07-23 ENCOUNTER — APPOINTMENT (OUTPATIENT)
Dept: OBGYN | Facility: CLINIC | Age: 39
End: 2024-07-23
Payer: MEDICAID

## 2024-07-23 ENCOUNTER — LABORATORY RESULT (OUTPATIENT)
Age: 39
End: 2024-07-23

## 2024-07-23 VITALS — DIASTOLIC BLOOD PRESSURE: 74 MMHG | WEIGHT: 118 LBS | BODY MASS INDEX: 20.9 KG/M2 | SYSTOLIC BLOOD PRESSURE: 116 MMHG

## 2024-07-23 DIAGNOSIS — D64.9 ANEMIA, UNSPECIFIED: ICD-10-CM

## 2024-07-23 DIAGNOSIS — Z01.419 ENCOUNTER FOR GYNECOLOGICAL EXAMINATION (GENERAL) (ROUTINE) W/OUT ABNORMAL FINDINGS: ICD-10-CM

## 2024-07-23 DIAGNOSIS — N93.9 ABNORMAL UTERINE AND VAGINAL BLEEDING, UNSPECIFIED: ICD-10-CM

## 2024-07-23 PROCEDURE — 99395 PREV VISIT EST AGE 18-39: CPT

## 2024-07-23 NOTE — PLAN
[FreeTextEntry1] : pelvic sonogram rec endometrial ablation  risks complications and treatment options discussed with patient patient agrees and wants to proceed with surgery

## 2024-07-23 NOTE — HISTORY OF PRESENT ILLNESS
[FreeTextEntry1] : here for annual complains of heavy menses, has anemia  hx BTL  
Price (Do Not Change): 0.00
Instructions: This plan will send the code FBSE to the PM system.  DO NOT or CHANGE the price.
Detail Level: Simple

## 2024-07-29 ENCOUNTER — RESULT REVIEW (OUTPATIENT)
Age: 39
End: 2024-07-29

## 2024-07-29 ENCOUNTER — APPOINTMENT (OUTPATIENT)
Dept: ULTRASOUND IMAGING | Facility: CLINIC | Age: 39
End: 2024-07-29
Payer: MEDICAID

## 2024-07-29 PROCEDURE — 76830 TRANSVAGINAL US NON-OB: CPT

## 2024-07-29 PROCEDURE — 76856 US EXAM PELVIC COMPLETE: CPT

## 2024-07-31 ENCOUNTER — OUTPATIENT (OUTPATIENT)
Dept: OUTPATIENT SERVICES | Facility: HOSPITAL | Age: 39
LOS: 1 days | End: 2024-07-31
Payer: MEDICAID

## 2024-07-31 VITALS
TEMPERATURE: 98 F | HEART RATE: 75 BPM | OXYGEN SATURATION: 99 % | RESPIRATION RATE: 16 BRPM | DIASTOLIC BLOOD PRESSURE: 73 MMHG | HEIGHT: 63 IN | SYSTOLIC BLOOD PRESSURE: 111 MMHG | WEIGHT: 117.95 LBS

## 2024-07-31 DIAGNOSIS — D64.9 ANEMIA, UNSPECIFIED: ICD-10-CM

## 2024-07-31 DIAGNOSIS — Z01.818 ENCOUNTER FOR OTHER PREPROCEDURAL EXAMINATION: ICD-10-CM

## 2024-07-31 DIAGNOSIS — Z78.9 OTHER SPECIFIED HEALTH STATUS: ICD-10-CM

## 2024-07-31 DIAGNOSIS — Z98.891 HISTORY OF UTERINE SCAR FROM PREVIOUS SURGERY: Chronic | ICD-10-CM

## 2024-07-31 DIAGNOSIS — Z98.82 BREAST IMPLANT STATUS: Chronic | ICD-10-CM

## 2024-07-31 DIAGNOSIS — Z98.890 OTHER SPECIFIED POSTPROCEDURAL STATES: Chronic | ICD-10-CM

## 2024-07-31 DIAGNOSIS — Z98.51 TUBAL LIGATION STATUS: Chronic | ICD-10-CM

## 2024-07-31 DIAGNOSIS — N93.9 ABNORMAL UTERINE AND VAGINAL BLEEDING, UNSPECIFIED: ICD-10-CM

## 2024-07-31 LAB
ANION GAP SERPL CALC-SCNC: 5 MMOL/L — SIGNIFICANT CHANGE UP (ref 5–17)
APTT BLD: 30.4 SEC — SIGNIFICANT CHANGE UP (ref 24.5–35.6)
BLD GP AB SCN SERPL QL: SIGNIFICANT CHANGE UP
BUN SERPL-MCNC: 10 MG/DL — SIGNIFICANT CHANGE UP (ref 7–18)
CALCIUM SERPL-MCNC: 9 MG/DL — SIGNIFICANT CHANGE UP (ref 8.4–10.5)
CHLORIDE SERPL-SCNC: 109 MMOL/L — HIGH (ref 96–108)
CO2 SERPL-SCNC: 25 MMOL/L — SIGNIFICANT CHANGE UP (ref 22–31)
CREAT SERPL-MCNC: 0.62 MG/DL — SIGNIFICANT CHANGE UP (ref 0.5–1.3)
EGFR: 117 ML/MIN/1.73M2 — SIGNIFICANT CHANGE UP
GLUCOSE SERPL-MCNC: 91 MG/DL — SIGNIFICANT CHANGE UP (ref 70–99)
HCT VFR BLD CALC: 36.3 % — SIGNIFICANT CHANGE UP (ref 34.5–45)
HGB BLD-MCNC: 12.3 G/DL — SIGNIFICANT CHANGE UP (ref 11.5–15.5)
INR BLD: 1.05 RATIO — SIGNIFICANT CHANGE UP (ref 0.85–1.18)
MCHC RBC-ENTMCNC: 29.4 PG — SIGNIFICANT CHANGE UP (ref 27–34)
MCHC RBC-ENTMCNC: 33.9 GM/DL — SIGNIFICANT CHANGE UP (ref 32–36)
MCV RBC AUTO: 86.6 FL — SIGNIFICANT CHANGE UP (ref 80–100)
NRBC # BLD: 0 /100 WBCS — SIGNIFICANT CHANGE UP (ref 0–0)
PLATELET # BLD AUTO: 273 K/UL — SIGNIFICANT CHANGE UP (ref 150–400)
POTASSIUM SERPL-MCNC: 4.5 MMOL/L — SIGNIFICANT CHANGE UP (ref 3.5–5.3)
POTASSIUM SERPL-SCNC: 4.5 MMOL/L — SIGNIFICANT CHANGE UP (ref 3.5–5.3)
PROTHROM AB SERPL-ACNC: 12 SEC — SIGNIFICANT CHANGE UP (ref 9.5–13)
RBC # BLD: 4.19 M/UL — SIGNIFICANT CHANGE UP (ref 3.8–5.2)
RBC # FLD: 12.6 % — SIGNIFICANT CHANGE UP (ref 10.3–14.5)
SODIUM SERPL-SCNC: 139 MMOL/L — SIGNIFICANT CHANGE UP (ref 135–145)
WBC # BLD: 6.66 K/UL — SIGNIFICANT CHANGE UP (ref 3.8–10.5)
WBC # FLD AUTO: 6.66 K/UL — SIGNIFICANT CHANGE UP (ref 3.8–10.5)

## 2024-07-31 NOTE — H&P PST ADULT - HISTORY OF PRESENT ILLNESS
39 yr old Romanian speaking female with history of anemia ( heavy vaginal bleeding only on iron supplements) presents with abnormal uterine and vaginal bleeding unspecified. Pt states her menstrual cycles last about 8 days with the first 3 days heavy. Pt scheduled for D&C with hysteroscopy and endometrial ablation on 8/14/2024. All information obtained via  [facility] # [id number].

## 2024-07-31 NOTE — H&P PST ADULT - NSICDXFAMILYHX_GEN_ALL_CORE_FT
FAMILY HISTORY:  Mother  Still living? Yes, Estimated age: 63  Family history of diabetes mellitus (DM), Age at diagnosis: Age Unknown

## 2024-07-31 NOTE — H&P PST ADULT - NSICDXPASTSURGICALHX_GEN_ALL_CORE_FT
PAST SURGICAL HISTORY:  H/O bilateral breast implants     H/O breast augmentation     H/O liposuction of abdomen     History of 2  sections     S/P tubal ligation

## 2024-07-31 NOTE — H&P PST ADULT - RESPIRATORY
patient
normal/clear to auscultation bilaterally/no wheezes/no rales/no rhonchi/no respiratory distress/no use of accessory muscles

## 2024-07-31 NOTE — H&P PST ADULT - ASSESSMENT
39 yr old Bulgarian speaking female with history of anemia ( heavy vaginal bleeding only on iron supplements) presents with abnormal uterine and vaginal bleeding unspecified. Pt states her menstrual cycles last about 8 days with the first 3 days heavy. Pt scheduled for D&C with hysteroscopy and endometrial ablation on 8/14/2024. All information obtained via  [facility] # [id number].

## 2024-07-31 NOTE — H&P PST ADULT - PROBLEM SELECTOR PLAN 1
scheduled for D&C with hysteroscopy and endometrial ablation     Pt instructed to be NPO the morning and the night before surgery. Provided with chlorhexidene 4% solution to wash for 3 days including the morning of surgery. Written instructions given and reviewed with pt via . All questions answered. Pt told to use only Tylenol if needed prior to surgery. Escort needed.    LESLIE=0 low risk .

## 2024-07-31 NOTE — H&P PST ADULT - ATTENDING COMMENTS
all risks and complications discussed with patient, including damage to any organ nearby, infection and bleeding   she understands and wants to have the surgery

## 2024-07-31 NOTE — H&P PST ADULT - NSICDXPASTMEDICALHX_GEN_ALL_CORE_FT
PAST MEDICAL HISTORY:  Abnormal uterine and vaginal bleeding, unspecified     Anemia     Language barrier to communication

## 2024-08-01 PROCEDURE — 80048 BASIC METABOLIC PNL TOTAL CA: CPT

## 2024-08-01 PROCEDURE — 85027 COMPLETE CBC AUTOMATED: CPT

## 2024-08-01 PROCEDURE — T1013: CPT

## 2024-08-01 PROCEDURE — 85730 THROMBOPLASTIN TIME PARTIAL: CPT

## 2024-08-01 PROCEDURE — 85610 PROTHROMBIN TIME: CPT

## 2024-08-01 PROCEDURE — 86900 BLOOD TYPING SEROLOGIC ABO: CPT

## 2024-08-01 PROCEDURE — 86901 BLOOD TYPING SEROLOGIC RH(D): CPT

## 2024-08-01 PROCEDURE — 86850 RBC ANTIBODY SCREEN: CPT

## 2024-08-01 PROCEDURE — G0463: CPT

## 2024-08-01 PROCEDURE — 36415 COLL VENOUS BLD VENIPUNCTURE: CPT

## 2024-08-02 PROBLEM — Z78.9 OTHER SPECIFIED HEALTH STATUS: Chronic | Status: ACTIVE | Noted: 2024-07-31

## 2024-08-02 PROBLEM — D64.9 ANEMIA, UNSPECIFIED: Chronic | Status: ACTIVE | Noted: 2024-07-31

## 2024-08-02 PROBLEM — N93.9 ABNORMAL UTERINE AND VAGINAL BLEEDING, UNSPECIFIED: Chronic | Status: ACTIVE | Noted: 2024-07-31

## 2024-08-14 ENCOUNTER — TRANSCRIPTION ENCOUNTER (OUTPATIENT)
Age: 39
End: 2024-08-14

## 2024-08-14 ENCOUNTER — RESULT REVIEW (OUTPATIENT)
Age: 39
End: 2024-08-14

## 2024-08-14 ENCOUNTER — APPOINTMENT (OUTPATIENT)
Dept: OBGYN | Facility: HOSPITAL | Age: 39
End: 2024-08-14

## 2024-08-14 RX ORDER — FERROUS GLUCONATE 325 MG
1 TABLET ORAL
Refills: 0 | DISCHARGE

## 2024-08-28 ENCOUNTER — OUTPATIENT (OUTPATIENT)
Dept: OUTPATIENT SERVICES | Facility: HOSPITAL | Age: 39
LOS: 1 days | End: 2024-08-28
Payer: MEDICAID

## 2024-08-28 ENCOUNTER — APPOINTMENT (OUTPATIENT)
Dept: OBGYN | Facility: CLINIC | Age: 39
End: 2024-08-28
Payer: MEDICAID

## 2024-08-28 VITALS
TEMPERATURE: 97.3 F | BODY MASS INDEX: 21.97 KG/M2 | HEIGHT: 63 IN | RESPIRATION RATE: 18 BRPM | HEART RATE: 90 BPM | WEIGHT: 124 LBS | SYSTOLIC BLOOD PRESSURE: 109 MMHG | OXYGEN SATURATION: 98 % | DIASTOLIC BLOOD PRESSURE: 72 MMHG

## 2024-08-28 DIAGNOSIS — Z00.00 ENCOUNTER FOR GENERAL ADULT MEDICAL EXAMINATION WITHOUT ABNORMAL FINDINGS: ICD-10-CM

## 2024-08-28 DIAGNOSIS — Z98.82 BREAST IMPLANT STATUS: Chronic | ICD-10-CM

## 2024-08-28 DIAGNOSIS — Z87.898 PERSONAL HISTORY OF OTHER SPECIFIED CONDITIONS: ICD-10-CM

## 2024-08-28 DIAGNOSIS — Z98.51 TUBAL LIGATION STATUS: Chronic | ICD-10-CM

## 2024-08-28 DIAGNOSIS — Z98.890 OTHER SPECIFIED POSTPROCEDURAL STATES: Chronic | ICD-10-CM

## 2024-08-28 DIAGNOSIS — N89.8 OTHER SPECIFIED NONINFLAMMATORY DISORDERS OF VAGINA: ICD-10-CM

## 2024-08-28 DIAGNOSIS — Z98.891 HISTORY OF UTERINE SCAR FROM PREVIOUS SURGERY: Chronic | ICD-10-CM

## 2024-08-28 PROCEDURE — 99024 POSTOP FOLLOW-UP VISIT: CPT

## 2024-08-28 PROCEDURE — G0463: CPT

## 2024-08-28 RX ORDER — METRONIDAZOLE 7.5 MG/G
0.75 GEL VAGINAL
Qty: 1 | Refills: 0 | Status: ACTIVE | COMMUNITY
Start: 2024-08-28 | End: 1900-01-01

## 2024-08-29 ENCOUNTER — APPOINTMENT (OUTPATIENT)
Dept: OBGYN | Facility: CLINIC | Age: 39
End: 2024-08-29

## 2024-08-29 DIAGNOSIS — Z87.898 PERSONAL HISTORY OF OTHER SPECIFIED CONDITIONS: ICD-10-CM

## (undated) DEVICE — FORCEP RADIAL JAW 4 W NDL 2.2MM 2.8MM 240CM ORANGE DISP

## (undated) DEVICE — STERIS DEFENDO 3-PIECE KIT (AIR/WATER, SUCTION & BIOPSY VALVES)

## (undated) DEVICE — KIT ENDO PROCEDURE CUST W/VLV

## (undated) DEVICE — ADAPTER ENDO CHNL SINGLE USE

## (undated) DEVICE — TUBING CANNULA SALTER LABS NASAL ADULT 7FT

## (undated) DEVICE — CATH ELCTR GLIDE PRB 7FR

## (undated) DEVICE — SOL INJ NS 0.9% 500ML 1-PORT

## (undated) DEVICE — LUBRICATING JELLY ONESHOT 1.25OZ

## (undated) DEVICE — NDL INJ SCLERO INTERJECT 23G

## (undated) DEVICE — TUBING IV SET GRAVITY 3Y 100" MACRO

## (undated) DEVICE — CLAMP BX HOT RAD JAW 3

## (undated) DEVICE — SOLIDIFIER ISOLYZER 2000 CC

## (undated) DEVICE — SYR LUER LOK 50CC

## (undated) DEVICE — SNARE CAPTIVATOR II 15MM

## (undated) DEVICE — RETRIEVER ROTH NET PLATINUM-UNIVERSAL

## (undated) DEVICE — FORCEP BIOPSY 2.5MM DISP

## (undated) DEVICE — TUBING MEDI-VAC W MAXIGRIP CONNECTORS 1/4"X6'

## (undated) DEVICE — SENSOR O2 FINGER ADULT

## (undated) DEVICE — SNARE LOOP POLY DISP 30MM LOOP

## (undated) DEVICE — DRSG CURITY GAUZE SPONGE 4 X 4" 12-PLY

## (undated) DEVICE — CONTAINER FORMALIN 10% 20ML

## (undated) DEVICE — Device

## (undated) DEVICE — PLATE NESSY 170